# Patient Record
Sex: MALE | Race: WHITE | NOT HISPANIC OR LATINO | URBAN - METROPOLITAN AREA
[De-identification: names, ages, dates, MRNs, and addresses within clinical notes are randomized per-mention and may not be internally consistent; named-entity substitution may affect disease eponyms.]

---

## 2017-07-14 ENCOUNTER — APPOINTMENT (OUTPATIENT)
Dept: URBAN - METROPOLITAN AREA CLINIC 219 | Age: 72
Setting detail: DERMATOLOGY
End: 2017-07-14

## 2017-07-14 DIAGNOSIS — R20.2 PARESTHESIA OF SKIN: ICD-10-CM

## 2017-07-14 DIAGNOSIS — B35.3 TINEA PEDIS: ICD-10-CM

## 2017-07-14 DIAGNOSIS — L82.1 OTHER SEBORRHEIC KERATOSIS: ICD-10-CM

## 2017-07-14 PROBLEM — I10 ESSENTIAL (PRIMARY) HYPERTENSION: Status: ACTIVE | Noted: 2017-07-14

## 2017-07-14 PROCEDURE — OTHER REASSURANCE: OTHER

## 2017-07-14 PROCEDURE — OTHER PRESCRIPTION: OTHER

## 2017-07-14 PROCEDURE — 99214 OFFICE O/P EST MOD 30 MIN: CPT

## 2017-07-14 PROCEDURE — OTHER TREATMENT REGIMEN: OTHER

## 2017-07-14 RX ORDER — CICLOPIROX 7.7 MG/G
APPLY GEL TOPICAL
Qty: 1 | Refills: 3 | Status: ERX | COMMUNITY
Start: 2017-07-14

## 2017-07-14 ASSESSMENT — LOCATION SIMPLE DESCRIPTION DERM
LOCATION SIMPLE: LEFT PRETIBIAL REGION
LOCATION SIMPLE: RIGHT PRETIBIAL REGION
LOCATION SIMPLE: RIGHT UPPER BACK

## 2017-07-14 ASSESSMENT — LOCATION DETAILED DESCRIPTION DERM
LOCATION DETAILED: RIGHT MEDIAL UPPER BACK
LOCATION DETAILED: LEFT PROXIMAL PRETIBIAL REGION
LOCATION DETAILED: RIGHT PROXIMAL PRETIBIAL REGION

## 2017-07-14 ASSESSMENT — LOCATION ZONE DERM
LOCATION ZONE: LEG
LOCATION ZONE: TRUNK

## 2017-07-14 NOTE — PROCEDURE: TREATMENT REGIMEN
Continue Regimen: Ciclopirox 0.77% gel two times daily as needed between affected toes, left great toenail and feet
Otc Regimen: Sarna Lotion as needed for itching
Detail Level: Simple
Plan: 1 part vinegar/3 parts water soaks twice a day

## 2017-11-16 ENCOUNTER — APPOINTMENT (OUTPATIENT)
Dept: URBAN - METROPOLITAN AREA CLINIC 219 | Age: 72
Setting detail: DERMATOLOGY
End: 2017-11-16

## 2017-11-16 DIAGNOSIS — M10.0 IDIOPATHIC GOUT: ICD-10-CM

## 2017-11-16 PROBLEM — N40.0 BENIGN PROSTATIC HYPERPLASIA WITHOUT LOWER URINARY TRACT SYMPTOMS: Status: ACTIVE | Noted: 2017-11-16

## 2017-11-16 PROBLEM — M10.072 IDIOPATHIC GOUT, LEFT ANKLE AND FOOT: Status: ACTIVE | Noted: 2017-11-16

## 2017-11-16 PROCEDURE — OTHER TREATMENT REGIMEN: OTHER

## 2017-11-16 PROCEDURE — OTHER KOH PREP: OTHER

## 2017-11-16 PROCEDURE — 99213 OFFICE O/P EST LOW 20 MIN: CPT

## 2017-11-16 PROCEDURE — OTHER PRESCRIPTION: OTHER

## 2017-11-16 PROCEDURE — OTHER MIPS QUALITY: OTHER

## 2017-11-16 RX ORDER — PREDNISONE 10 MG/1
TABLET ORAL AS DIRECTED
Qty: 21 | Refills: 0 | Status: ERX

## 2017-11-16 RX ORDER — CICLOPIROX 7.7 MG/ML
APPLY SUSPENSION TOPICAL AS NEEDED
Qty: 1 | Refills: 5 | Status: ERX | COMMUNITY
Start: 2017-11-16

## 2017-11-16 ASSESSMENT — LOCATION ZONE DERM: LOCATION ZONE: TOE

## 2017-11-16 ASSESSMENT — LOCATION SIMPLE DESCRIPTION DERM: LOCATION SIMPLE: LEFT GREAT TOE

## 2017-11-16 ASSESSMENT — LOCATION DETAILED DESCRIPTION DERM: LOCATION DETAILED: LEFT DORSAL GREAT TOE

## 2017-11-16 NOTE — PROCEDURE: TREATMENT REGIMEN
Initiate Treatment: Ciclopirox ts twice a day as needed \\nPrednisone taper 40-20-10 x9 days
Plan: If persists patient to see Dr. Gonzales
Detail Level: Simple

## 2017-11-16 NOTE — HPI: SKIN IRRITATION
Additional History: Patient states he saw orthopedic doctor and they did an X-ray with nothing found,  was told probably fungal, something superficial.  Patient was told it was not gout.  Patient states it started 3 days ago and was much more swollen.

## 2018-12-30 NOTE — PROCEDURE: MIPS QUALITY
Detail Level: Detailed
Quality 110: Preventive Care And Screening: Influenza Immunization: Influenza Immunization Administered during Influenza season
WDL

## 2019-09-25 ENCOUNTER — APPOINTMENT (OUTPATIENT)
Dept: URBAN - METROPOLITAN AREA CLINIC 219 | Age: 74
Setting detail: DERMATOLOGY
End: 2019-09-25

## 2019-09-25 DIAGNOSIS — L85.3 XEROSIS CUTIS: ICD-10-CM

## 2019-09-25 DIAGNOSIS — L82.0 INFLAMED SEBORRHEIC KERATOSIS: ICD-10-CM

## 2019-09-25 DIAGNOSIS — L82.1 OTHER SEBORRHEIC KERATOSIS: ICD-10-CM

## 2019-09-25 DIAGNOSIS — L57.0 ACTINIC KERATOSIS: ICD-10-CM

## 2019-09-25 PROCEDURE — 99213 OFFICE O/P EST LOW 20 MIN: CPT | Mod: 25

## 2019-09-25 PROCEDURE — 17000 DESTRUCT PREMALG LESION: CPT | Mod: 59

## 2019-09-25 PROCEDURE — 17110 DESTRUCT B9 LESION 1-14: CPT

## 2019-09-25 PROCEDURE — 17003 DESTRUCT PREMALG LES 2-14: CPT | Mod: 59

## 2019-09-25 PROCEDURE — OTHER MIPS QUALITY: OTHER

## 2019-09-25 PROCEDURE — OTHER LIQUID NITROGEN: OTHER

## 2019-09-25 PROCEDURE — OTHER TREATMENT REGIMEN: OTHER

## 2019-09-25 PROCEDURE — OTHER REASSURANCE: OTHER

## 2019-09-25 ASSESSMENT — LOCATION SIMPLE DESCRIPTION DERM
LOCATION SIMPLE: RIGHT PRETIBIAL REGION
LOCATION SIMPLE: LEFT CHEEK
LOCATION SIMPLE: POSTERIOR SCALP
LOCATION SIMPLE: RIGHT FOREARM
LOCATION SIMPLE: RIGHT FOREHEAD
LOCATION SIMPLE: RIGHT CHEEK
LOCATION SIMPLE: LEFT PRETIBIAL REGION
LOCATION SIMPLE: RIGHT UPPER BACK
LOCATION SIMPLE: SCALP

## 2019-09-25 ASSESSMENT — LOCATION DETAILED DESCRIPTION DERM
LOCATION DETAILED: RIGHT MID-UPPER BACK
LOCATION DETAILED: RIGHT DISTAL PRETIBIAL REGION
LOCATION DETAILED: POSTERIOR MID-PARIETAL SCALP
LOCATION DETAILED: LEFT INFERIOR LATERAL MALAR CHEEK
LOCATION DETAILED: RIGHT FOREHEAD
LOCATION DETAILED: LEFT DISTAL PRETIBIAL REGION
LOCATION DETAILED: RIGHT CENTRAL MALAR CHEEK
LOCATION DETAILED: RIGHT SUPERIOR FOREHEAD
LOCATION DETAILED: RIGHT DISTAL RADIAL DORSAL FOREARM
LOCATION DETAILED: LEFT INFERIOR PREAURICULAR CHEEK
LOCATION DETAILED: RIGHT CENTRAL MANDIBULAR CHEEK
LOCATION DETAILED: LEFT SUPERIOR PARIETAL SCALP
LOCATION DETAILED: RIGHT SUPERIOR CENTRAL MALAR CHEEK
LOCATION DETAILED: RIGHT PROXIMAL PRETIBIAL REGION
LOCATION DETAILED: LEFT PROXIMAL PRETIBIAL REGION
LOCATION DETAILED: RIGHT PROXIMAL RADIAL DORSAL FOREARM

## 2019-09-25 ASSESSMENT — LOCATION ZONE DERM
LOCATION ZONE: FACE
LOCATION ZONE: SCALP
LOCATION ZONE: LEG
LOCATION ZONE: TRUNK
LOCATION ZONE: ARM

## 2019-09-25 NOTE — PROCEDURE: LIQUID NITROGEN
Post-Care Instructions: I reviewed with the patient in detail post-care instructions. Patient is to wear sunprotection, and avoid picking at any of the treated lesions. Pt may apply Vaseline to crusted or scabbing areas.
Number Of Freeze-Thaw Cycles: 1 freeze-thaw cycle
Medical Necessity Information: It is in your best interest to select a reason for this procedure from the list below. All of these items fulfill various CMS LCD requirements except the new and changing color options.
Detail Level: Detailed
Consent: The patient's consent was obtained including but not limited to risks of crusting, scabbing, blistering, scarring, darker or lighter pigmentary change, recurrence, incomplete removal and infection.
Render Note In Bullet Format When Appropriate: No
Duration Of Freeze Thaw-Cycle (Seconds): 0
Medical Necessity Clause: This procedure was medically necessary because the lesions that were treated were:

## 2021-01-19 ENCOUNTER — APPOINTMENT (OUTPATIENT)
Dept: URBAN - METROPOLITAN AREA CLINIC 219 | Age: 76
Setting detail: DERMATOLOGY
End: 2021-01-19

## 2021-01-19 DIAGNOSIS — L57.0 ACTINIC KERATOSIS: ICD-10-CM

## 2021-01-19 DIAGNOSIS — L85.3 XEROSIS CUTIS: ICD-10-CM

## 2021-01-19 PROCEDURE — OTHER MIPS QUALITY: OTHER

## 2021-01-19 PROCEDURE — 17003 DESTRUCT PREMALG LES 2-14: CPT

## 2021-01-19 PROCEDURE — 99212 OFFICE O/P EST SF 10 MIN: CPT | Mod: 25

## 2021-01-19 PROCEDURE — 17000 DESTRUCT PREMALG LESION: CPT

## 2021-01-19 PROCEDURE — OTHER LIQUID NITROGEN: OTHER

## 2021-01-19 PROCEDURE — OTHER TREATMENT REGIMEN: OTHER

## 2021-01-19 ASSESSMENT — LOCATION SIMPLE DESCRIPTION DERM
LOCATION SIMPLE: RIGHT UPPER ARM
LOCATION SIMPLE: RIGHT CHEEK
LOCATION SIMPLE: LEFT PRETIBIAL REGION
LOCATION SIMPLE: RIGHT PRETIBIAL REGION
LOCATION SIMPLE: RIGHT UPPER BACK

## 2021-01-19 ASSESSMENT — LOCATION DETAILED DESCRIPTION DERM
LOCATION DETAILED: RIGHT LATERAL UPPER BACK
LOCATION DETAILED: RIGHT ANTERIOR PROXIMAL UPPER ARM
LOCATION DETAILED: RIGHT PROXIMAL PRETIBIAL REGION
LOCATION DETAILED: RIGHT SUPERIOR CENTRAL MALAR CHEEK
LOCATION DETAILED: RIGHT DISTAL PRETIBIAL REGION
LOCATION DETAILED: LEFT DISTAL PRETIBIAL REGION

## 2021-01-19 ASSESSMENT — LOCATION ZONE DERM
LOCATION ZONE: FACE
LOCATION ZONE: TRUNK
LOCATION ZONE: LEG
LOCATION ZONE: ARM

## 2021-01-19 NOTE — PROCEDURE: TREATMENT REGIMEN
Plan: Increase emollients (Cerave Cream)\\nMild cleansers- Dove unscented bar soap
Detail Level: Simple

## 2021-12-28 ENCOUNTER — OFFICE VISIT (OUTPATIENT)
Dept: URBAN - NONMETROPOLITAN AREA SURGERY CENTER 1 | Facility: SURGERY CENTER | Age: 76
End: 2021-12-28
Payer: MEDICARE

## 2021-12-28 DIAGNOSIS — Z12.11 AVERAGE RISK FOR CRC. DUE TO PT'S CO-MORBID STATE WITH END STAGE DEMENTIA, HIGH RISK FOR ANESTHESIA (PER NEUROLOGY); INABILITY TO TAKE A BOWEL PREP....WOULD NOT ADVISE ANY COLORECTAL CANCER SCREENING INCLUDING STOOL TEST FOR FECAL BLOOD.: ICD-10-CM

## 2021-12-28 PROCEDURE — G0121 COLON CA SCRN NOT HI RSK IND: HCPCS | Performed by: INTERNAL MEDICINE

## 2021-12-28 PROCEDURE — G8907 PT DOC NO EVENTS ON DISCHARG: HCPCS | Performed by: INTERNAL MEDICINE

## 2022-05-11 ENCOUNTER — APPOINTMENT (OUTPATIENT)
Dept: URBAN - NONMETROPOLITAN AREA CLINIC 45 | Age: 77
Setting detail: DERMATOLOGY
End: 2022-05-11

## 2022-05-11 DIAGNOSIS — L57.0 ACTINIC KERATOSIS: ICD-10-CM

## 2022-05-11 DIAGNOSIS — L85.3 XEROSIS CUTIS: ICD-10-CM

## 2022-05-11 PROBLEM — H91.90 UNSPECIFIED HEARING LOSS, UNSPECIFIED EAR: Status: ACTIVE | Noted: 2022-05-11

## 2022-05-11 PROCEDURE — OTHER TREATMENT REGIMEN: OTHER

## 2022-05-11 PROCEDURE — 99213 OFFICE O/P EST LOW 20 MIN: CPT | Mod: 25

## 2022-05-11 PROCEDURE — OTHER LIQUID NITROGEN: OTHER

## 2022-05-11 PROCEDURE — 17000 DESTRUCT PREMALG LESION: CPT

## 2022-05-11 PROCEDURE — OTHER MIPS QUALITY: OTHER

## 2022-05-11 PROCEDURE — 17003 DESTRUCT PREMALG LES 2-14: CPT

## 2022-05-11 ASSESSMENT — LOCATION SIMPLE DESCRIPTION DERM
LOCATION SIMPLE: RIGHT PRETIBIAL REGION
LOCATION SIMPLE: LEFT PRETIBIAL REGION
LOCATION SIMPLE: RIGHT CHEEK
LOCATION SIMPLE: RIGHT FOREHEAD
LOCATION SIMPLE: LEFT CHEEK

## 2022-05-11 ASSESSMENT — LOCATION DETAILED DESCRIPTION DERM
LOCATION DETAILED: RIGHT DISTAL PRETIBIAL REGION
LOCATION DETAILED: LEFT DISTAL PRETIBIAL REGION
LOCATION DETAILED: RIGHT CENTRAL MALAR CHEEK
LOCATION DETAILED: RIGHT SUPERIOR FOREHEAD
LOCATION DETAILED: LEFT MEDIAL MALAR CHEEK
LOCATION DETAILED: RIGHT LATERAL BUCCAL CHEEK

## 2022-05-11 ASSESSMENT — LOCATION ZONE DERM
LOCATION ZONE: FACE
LOCATION ZONE: LEG

## 2022-05-11 NOTE — PROCEDURE: TREATMENT REGIMEN
Samples Given: Aveeno Cream, Cerave Cream
Detail Level: Simple
Continue Regimen: Increase emollients (Cerave Cream)\\nMild cleansers- Dove unscented bar soap

## 2022-05-11 NOTE — PROCEDURE: LIQUID NITROGEN
Consent: The patient's consent was obtained including but not limited to risks of crusting, scabbing, blistering, scarring, darker or lighter pigmentary change, recurrence, incomplete removal and infection.
Duration Of Freeze Thaw-Cycle (Seconds): 0
Render Post-Care Instructions In Note?: no
Detail Level: Detailed
Post-Care Instructions: I reviewed with the patient in detail post-care instructions. Patient is to wear sunprotection, and avoid picking at any of the treated lesions. Pt may apply Vaseline to crusted or scabbing areas.
Show Aperture Variable?: Yes

## 2023-02-23 ENCOUNTER — WEB ENCOUNTER (OUTPATIENT)
Dept: URBAN - METROPOLITAN AREA TELEHEALTH 2 | Facility: TELEHEALTH | Age: 78
End: 2023-02-23

## 2023-03-01 ENCOUNTER — TELEPHONE ENCOUNTER (OUTPATIENT)
Dept: URBAN - METROPOLITAN AREA CLINIC 92 | Facility: CLINIC | Age: 78
End: 2023-03-01

## 2023-03-01 ENCOUNTER — OFFICE VISIT (OUTPATIENT)
Dept: URBAN - METROPOLITAN AREA TELEHEALTH 2 | Facility: TELEHEALTH | Age: 78
End: 2023-03-01
Payer: MEDICARE

## 2023-03-01 DIAGNOSIS — R15.1 FECAL SMEARING: ICD-10-CM

## 2023-03-01 DIAGNOSIS — K21.9 GASTROESOPHAGEAL REFLUX DISEASE, UNSPECIFIED WHETHER ESOPHAGITIS PRESENT: ICD-10-CM

## 2023-03-01 DIAGNOSIS — R13.19 ESOPHAGEAL DYSPHAGIA: ICD-10-CM

## 2023-03-01 DIAGNOSIS — K57.30 ACQUIRED DIVERTICULOSIS OF COLON: ICD-10-CM

## 2023-03-01 PROBLEM — 305058001: Status: ACTIVE | Noted: 2023-03-01

## 2023-03-01 PROCEDURE — 99214 OFFICE O/P EST MOD 30 MIN: CPT | Performed by: NURSE PRACTITIONER

## 2023-03-01 RX ORDER — ASPIRIN 81 MG/1
1 TABLET TABLET, CHEWABLE ORAL ONCE A DAY
Status: ACTIVE | COMMUNITY

## 2023-03-01 NOTE — HPI-TODAY'S VISIT:
3/1/2023 Mr. Nathan presents for evaluation of GERD. He was referred by Dr. Lindsey. Last fall he developed a cough. He started omeprazole 20mg daily with no change. He increased his symptoms to omeprazole 40mg daily. He had an esophagram with reflux but no stricture. He did have occsasional orophayrgeal dysphagia. Howeever this improved. His cough seemed to improve. He did have some loose stools and he was placed on Luis Carlos and weaned his omeprazole to 20mg daily. He is now off PPI therapy. He has no symptoms of cough now. He denies any significant further GI complaints.   In August during a trip he developed increased gas with mild smearing with passing gas. He had a colonoscopy in 2021 with pan diverticular diseae. This has improved today. MB  This telehealth visit was provided at the patients home.

## 2023-03-27 ENCOUNTER — WEB ENCOUNTER (OUTPATIENT)
Dept: URBAN - NONMETROPOLITAN AREA CLINIC 2 | Facility: CLINIC | Age: 78
End: 2023-03-27

## 2023-03-28 ENCOUNTER — WEB ENCOUNTER (OUTPATIENT)
Dept: URBAN - NONMETROPOLITAN AREA CLINIC 2 | Facility: CLINIC | Age: 78
End: 2023-03-28

## 2023-04-24 ENCOUNTER — WEB ENCOUNTER (OUTPATIENT)
Dept: URBAN - NONMETROPOLITAN AREA CLINIC 13 | Facility: CLINIC | Age: 78
End: 2023-04-24

## 2023-04-26 ENCOUNTER — OFFICE VISIT (OUTPATIENT)
Dept: URBAN - NONMETROPOLITAN AREA CLINIC 13 | Facility: CLINIC | Age: 78
End: 2023-04-26

## 2023-05-09 ENCOUNTER — TELEPHONE ENCOUNTER (OUTPATIENT)
Dept: URBAN - NONMETROPOLITAN AREA CLINIC 2 | Facility: CLINIC | Age: 78
End: 2023-05-09

## 2023-05-10 ENCOUNTER — LAB OUTSIDE AN ENCOUNTER (OUTPATIENT)
Dept: URBAN - NONMETROPOLITAN AREA CLINIC 13 | Facility: CLINIC | Age: 78
End: 2023-05-10

## 2023-05-10 ENCOUNTER — WEB ENCOUNTER (OUTPATIENT)
Dept: URBAN - NONMETROPOLITAN AREA CLINIC 13 | Facility: CLINIC | Age: 78
End: 2023-05-10

## 2023-05-10 ENCOUNTER — OFFICE VISIT (OUTPATIENT)
Dept: URBAN - NONMETROPOLITAN AREA CLINIC 13 | Facility: CLINIC | Age: 78
End: 2023-05-10
Payer: MEDICARE

## 2023-05-10 VITALS
TEMPERATURE: 98.5 F | SYSTOLIC BLOOD PRESSURE: 131 MMHG | WEIGHT: 158.2 LBS | HEART RATE: 69 BPM | DIASTOLIC BLOOD PRESSURE: 72 MMHG | HEIGHT: 62 IN | BODY MASS INDEX: 29.11 KG/M2

## 2023-05-10 DIAGNOSIS — K21.9 GASTROESOPHAGEAL REFLUX DISEASE, UNSPECIFIED WHETHER ESOPHAGITIS PRESENT: ICD-10-CM

## 2023-05-10 DIAGNOSIS — R13.19 ESOPHAGEAL DYSPHAGIA: ICD-10-CM

## 2023-05-10 PROCEDURE — 99214 OFFICE O/P EST MOD 30 MIN: CPT | Performed by: NURSE PRACTITIONER

## 2023-05-10 RX ORDER — FAMOTIDINE 20 MG/1
1 TABLET TABLET ORAL ONCE A DAY
Qty: 90 TABLET | Refills: 3 | OUTPATIENT
Start: 2023-05-10

## 2023-05-10 RX ORDER — ASPIRIN 81 MG/1
1 TABLET TABLET, CHEWABLE ORAL ONCE A DAY
Status: ACTIVE | COMMUNITY

## 2023-05-16 ENCOUNTER — WEB ENCOUNTER (OUTPATIENT)
Dept: URBAN - NONMETROPOLITAN AREA SURGERY CENTER 1 | Facility: SURGERY CENTER | Age: 78
End: 2023-05-16

## 2023-05-19 ENCOUNTER — OFFICE VISIT (OUTPATIENT)
Dept: URBAN - NONMETROPOLITAN AREA SURGERY CENTER 1 | Facility: SURGERY CENTER | Age: 78
End: 2023-05-19
Payer: MEDICARE

## 2023-05-19 ENCOUNTER — CLAIMS CREATED FROM THE CLAIM WINDOW (OUTPATIENT)
Dept: URBAN - METROPOLITAN AREA CLINIC 4 | Facility: CLINIC | Age: 78
End: 2023-05-19
Payer: MEDICARE

## 2023-05-19 DIAGNOSIS — K21.9 GASTRO-ESOPHAGEAL REFLUX DISEASE WITHOUT ESOPHAGITIS: ICD-10-CM

## 2023-05-19 DIAGNOSIS — K21.9 ACID REFLUX: ICD-10-CM

## 2023-05-19 DIAGNOSIS — K31.89 OTHER DISEASES OF STOMACH AND DUODENUM: ICD-10-CM

## 2023-05-19 DIAGNOSIS — K31.89 ACQUIRED DEFORMITY OF DUODENUM: ICD-10-CM

## 2023-05-19 PROCEDURE — 88342 IMHCHEM/IMCYTCHM 1ST ANTB: CPT | Performed by: PATHOLOGY

## 2023-05-19 PROCEDURE — G8907 PT DOC NO EVENTS ON DISCHARG: HCPCS | Performed by: INTERNAL MEDICINE

## 2023-05-19 PROCEDURE — 88305 TISSUE EXAM BY PATHOLOGIST: CPT | Performed by: PATHOLOGY

## 2023-05-19 PROCEDURE — 88312 SPECIAL STAINS GROUP 1: CPT | Performed by: PATHOLOGY

## 2023-05-19 PROCEDURE — 43239 EGD BIOPSY SINGLE/MULTIPLE: CPT | Performed by: INTERNAL MEDICINE

## 2023-05-31 ENCOUNTER — WEB ENCOUNTER (OUTPATIENT)
Dept: URBAN - NONMETROPOLITAN AREA CLINIC 2 | Facility: CLINIC | Age: 78
End: 2023-05-31

## 2023-06-13 ENCOUNTER — WEB ENCOUNTER (OUTPATIENT)
Dept: URBAN - NONMETROPOLITAN AREA CLINIC 2 | Facility: CLINIC | Age: 78
End: 2023-06-13

## 2023-08-01 NOTE — HPI-TODAY'S VISIT:
3/1/2023 Mr. Nathan presents for evaluation of GERD. He was referred by Dr. Lindsey. Last fall he developed a cough. He started omeprazole 20mg daily with no change. He increased his symptoms to omeprazole 40mg daily. He had an esophagram with reflux but no stricture. He did have occsasional orophayrgeal dysphagia. Howeever this improved. His cough seemed to improve. He did have some loose stools and he was placed on Luis Carlos and weaned his omeprazole to 20mg daily. He is now off PPI therapy. He has no symptoms of cough now. He denies any significant further GI complaints.   In August during a trip he developed increased gas with mild smearing with passing gas. He had a colonoscopy in 2021 with pan diverticular diseae. This has improved today. MB  This telehealth visit was provided at the patients home. 5/10/2023 Mr. Nathan presents for follow-up of reflux, dysphagia, and fecal smearing.  He is doing great on psyllium 2 at night.  He had no further fecal smearing.  His bloating has improved, he has been taking IBgard twice daily as needed.  He weaned off of the PPI then off of the Pepcid.  He has had an increase recurrence of reflux after lunch, we have discussed that the IBgard may be related.  He has not had an upper endoscopy in over the past 10 years.  Today we will pursue EGD to rule out Krishna's esophagus, I want him to restart Pepcid just in the morning.  Consider PPI therapy pending his EGD results.  Today he is doing well otherwise.  MB 
What Type Of Note Output Would You Prefer (Optional)?: Standard Output
Hpi Title: Evaluation of Skin Lesions

## 2023-08-07 ENCOUNTER — WEB ENCOUNTER (OUTPATIENT)
Dept: URBAN - NONMETROPOLITAN AREA CLINIC 13 | Facility: CLINIC | Age: 78
End: 2023-08-07

## 2023-08-23 ENCOUNTER — OFFICE VISIT (OUTPATIENT)
Dept: URBAN - NONMETROPOLITAN AREA CLINIC 13 | Facility: CLINIC | Age: 78
End: 2023-08-23
Payer: MEDICARE

## 2023-08-23 VITALS
SYSTOLIC BLOOD PRESSURE: 135 MMHG | TEMPERATURE: 98.6 F | HEIGHT: 62 IN | DIASTOLIC BLOOD PRESSURE: 85 MMHG | HEART RATE: 62 BPM | BODY MASS INDEX: 29.11 KG/M2 | WEIGHT: 158.2 LBS

## 2023-08-23 DIAGNOSIS — K21.9 ACID REFLUX: ICD-10-CM

## 2023-08-23 DIAGNOSIS — K57.90 DIVERTICULOSIS: ICD-10-CM

## 2023-08-23 DIAGNOSIS — R15.1 FECAL SMEARING: ICD-10-CM

## 2023-08-23 DIAGNOSIS — R13.19 ESOPHAGEAL DYSPHAGIA: ICD-10-CM

## 2023-08-23 PROCEDURE — 99214 OFFICE O/P EST MOD 30 MIN: CPT | Performed by: NURSE PRACTITIONER

## 2023-08-23 RX ORDER — FAMOTIDINE 20 MG/1
1 TABLET TABLET ORAL TWICE DAILY
Qty: 180 TABLET | Refills: 3
Start: 2023-05-10

## 2023-08-23 RX ORDER — FAMOTIDINE 20 MG/1
1 TABLET AT BEDTIME AS NEEDED TABLET ORAL ONCE A DAY
Qty: 90 TABLET | Refills: 3 | Status: ACTIVE | COMMUNITY
Start: 2023-05-10

## 2023-08-23 RX ORDER — ASPIRIN 81 MG/1
1 TABLET TABLET, CHEWABLE ORAL ONCE A DAY
Status: ACTIVE | COMMUNITY

## 2023-08-23 NOTE — HPI-TODAY'S VISIT:
3/1/2023 Mr. Nathan presents for evaluation of GERD. He was referred by Dr. Lindsey. Last fall he developed a cough. He started omeprazole 20mg daily with no change. He increased his symptoms to omeprazole 40mg daily. He had an esophagram with reflux but no stricture. He did have occsasional orophayrgeal dysphagia. Howeever this improved. His cough seemed to improve. He did have some loose stools and he was placed on Luis Carlos and weaned his omeprazole to 20mg daily. He is now off PPI therapy. He has no symptoms of cough now. He denies any significant further GI complaints.   In August during a trip he developed increased gas with mild smearing with passing gas. He had a colonoscopy in 2021 with pan diverticular diseae. This has improved today. MB  This telehealth visit was provided at the patients home. 5/10/2023 Mr. Nathan presents for follow-up of reflux, dysphagia, and fecal smearing.  He is doing great on psyllium 2 at night.  He had no further fecal smearing.  His bloating has improved, he has been taking IBgard twice daily as needed.  He weaned off of the PPI then off of the Pepcid.  He has had an increase recurrence of reflux after lunch, we have discussed that the IBgard may be related.  He has not had an upper endoscopy in over the past 10 years.  Today we will pursue EGD to rule out Krishna's esophagus, I want him to restart Pepcid just in the morning.  Consider PPI therapy pending his EGD results.  Today he is doing well otherwise.  MB  8/23/2023 Mr. Nathan presents for follow-up of dysphagia.  His EGD does reveal mild esophagitis.  He is on Pepcid 20 mg at night.  He has dyspepsia with belching and some reflux after breakfast and before lunch at times.  He would like to try Pepcid twice daily, he would like to avoid PPI use as he has a strong family history of dementia.  We have discussed again that low-dose PPI is not associated with vascular dementia.  He also continues to struggle with tenesmus and fecal smearing.  He is on 2 capsules of Metamucil twice daily.  His last colonoscopy was in 2021 by Dr. Corey with internal hemorrhoids.  We have discussed this is likely the culprit.  He agrees to steroid suppositories twice daily x14 days.  Consider flexible sigmoidoscopy if no relief.  MB

## 2023-10-11 ENCOUNTER — APPOINTMENT (OUTPATIENT)
Dept: URBAN - NONMETROPOLITAN AREA CLINIC 45 | Age: 78
Setting detail: DERMATOLOGY
End: 2023-10-20

## 2023-10-11 DIAGNOSIS — L57.8 OTHER SKIN CHANGES DUE TO CHRONIC EXPOSURE TO NONIONIZING RADIATION: ICD-10-CM

## 2023-10-11 DIAGNOSIS — L82.0 INFLAMED SEBORRHEIC KERATOSIS: ICD-10-CM

## 2023-10-11 DIAGNOSIS — L57.0 ACTINIC KERATOSIS: ICD-10-CM

## 2023-10-11 PROCEDURE — OTHER COUNSELING: OTHER

## 2023-10-11 PROCEDURE — 17003 DESTRUCT PREMALG LES 2-14: CPT | Mod: 59

## 2023-10-11 PROCEDURE — OTHER MIPS QUALITY: OTHER

## 2023-10-11 PROCEDURE — 17000 DESTRUCT PREMALG LESION: CPT | Mod: 59

## 2023-10-11 PROCEDURE — 99213 OFFICE O/P EST LOW 20 MIN: CPT | Mod: 25

## 2023-10-11 PROCEDURE — 17110 DESTRUCT B9 LESION 1-14: CPT

## 2023-10-11 PROCEDURE — OTHER SUNSCREEN RECOMMENDATIONS: OTHER

## 2023-10-11 PROCEDURE — OTHER LIQUID NITROGEN: OTHER

## 2023-10-11 ASSESSMENT — LOCATION DETAILED DESCRIPTION DERM
LOCATION DETAILED: RIGHT CLAVICULAR NECK
LOCATION DETAILED: INFERIOR THORACIC SPINE
LOCATION DETAILED: LEFT MEDIAL ZYGOMA
LOCATION DETAILED: LEFT DISTAL ULNAR DORSAL FOREARM
LOCATION DETAILED: RIGHT INFERIOR FOREHEAD
LOCATION DETAILED: RIGHT CENTRAL ZYGOMA
LOCATION DETAILED: RIGHT MEDIAL UPPER BACK
LOCATION DETAILED: LEFT SUPERIOR UPPER BACK
LOCATION DETAILED: LEFT CENTRAL TEMPLE

## 2023-10-11 ASSESSMENT — LOCATION SIMPLE DESCRIPTION DERM
LOCATION SIMPLE: LEFT UPPER BACK
LOCATION SIMPLE: RIGHT FOREHEAD
LOCATION SIMPLE: RIGHT UPPER BACK
LOCATION SIMPLE: UPPER BACK
LOCATION SIMPLE: RIGHT ANTERIOR NECK
LOCATION SIMPLE: LEFT FOREARM
LOCATION SIMPLE: LEFT TEMPLE
LOCATION SIMPLE: LEFT ZYGOMA
LOCATION SIMPLE: RIGHT ZYGOMA

## 2023-10-11 ASSESSMENT — LOCATION ZONE DERM
LOCATION ZONE: NECK
LOCATION ZONE: TRUNK
LOCATION ZONE: ARM
LOCATION ZONE: FACE

## 2023-10-11 NOTE — PROCEDURE: LIQUID NITROGEN
Detail Level: Detailed
Medical Necessity Information: It is in your best interest to select a reason for this procedure from the list below. All of these items fulfill various CMS LCD requirements except the new and changing color options.
Spray Paint Technique: No
Show Applicator Variable?: Yes
Medical Necessity Clause: This procedure was medically necessary because the lesions that were treated were:
Spray Paint Text: The liquid nitrogen was applied to the skin utilizing a spray paint frosting technique.
Post-Care Instructions: I reviewed with the patient in detail post-care instructions. Patient is to wear sunprotection, and avoid picking at any of the treated lesions. Pt may apply Vaseline to crusted or scabbing areas.
Consent: The patient's consent was obtained including but not limited to risks of crusting, scabbing, blistering, scarring, darker or lighter pigmentary change, recurrence, incomplete removal and infection.
Duration Of Freeze Thaw-Cycle (Seconds): 0

## 2023-10-11 NOTE — PROCEDURE: MIPS QUALITY
Quality 47: Advance Care Plan: Advance Care Planning discussed and documented in the medical record; patient did not wish or was not able to name a surrogate decision maker or provide an advance care plan.
Detail Level: Detailed
Quality 431: Preventive Care And Screening: Unhealthy Alcohol Use - Screening: Patient not identified as an unhealthy alcohol user when screened for unhealthy alcohol use using a systematic screening method
Quality 130: Documentation Of Current Medications In The Medical Record: Current Medications Documented
Quality 110: Preventive Care And Screening: Influenza Immunization: Influenza Immunization Administered during Influenza season
Quality 226: Preventive Care And Screening: Tobacco Use: Screening And Cessation Intervention: Patient screened for tobacco use and is an ex/non-smoker
Quality 111:Pneumonia Vaccination Status For Older Adults: Patient received any pneumococcal conjugate or polysaccharide vaccine on or after their 60th birthday and before the end of the measurement period

## 2023-11-30 ENCOUNTER — CLAIMS CREATED FROM THE CLAIM WINDOW (OUTPATIENT)
Dept: URBAN - NONMETROPOLITAN AREA CLINIC 2 | Facility: CLINIC | Age: 78
End: 2023-11-30
Payer: MEDICARE

## 2023-11-30 VITALS
HEART RATE: 60 BPM | TEMPERATURE: 98 F | DIASTOLIC BLOOD PRESSURE: 94 MMHG | WEIGHT: 162 LBS | HEIGHT: 62 IN | BODY MASS INDEX: 29.81 KG/M2 | SYSTOLIC BLOOD PRESSURE: 162 MMHG

## 2023-11-30 DIAGNOSIS — Z12.11 COLON CANCER SCREENING: ICD-10-CM

## 2023-11-30 DIAGNOSIS — K57.90 DIVERTICULOSIS: ICD-10-CM

## 2023-11-30 DIAGNOSIS — R15.1 FECAL SMEARING: ICD-10-CM

## 2023-11-30 DIAGNOSIS — K21.9 GASTROESOPHAGEAL REFLUX DISEASE, UNSPECIFIED WHETHER ESOPHAGITIS PRESENT: ICD-10-CM

## 2023-11-30 DIAGNOSIS — R13.19 ESOPHAGEAL DYSPHAGIA: ICD-10-CM

## 2023-11-30 DIAGNOSIS — K64.9 HEMORRHOIDS, UNSPECIFIED HEMORRHOID TYPE: ICD-10-CM

## 2023-11-30 DIAGNOSIS — K21.9 ACID REFLUX: ICD-10-CM

## 2023-11-30 PROBLEM — 40890009: Status: ACTIVE | Noted: 2023-03-01

## 2023-11-30 PROBLEM — 397881000: Status: ACTIVE | Noted: 2023-03-01

## 2023-11-30 PROBLEM — 225593006: Status: ACTIVE | Noted: 2023-03-01

## 2023-11-30 PROCEDURE — 99214 OFFICE O/P EST MOD 30 MIN: CPT | Performed by: NURSE PRACTITIONER

## 2023-11-30 RX ORDER — ASPIRIN 81 MG/1
1 TABLET TABLET, CHEWABLE ORAL ONCE A DAY
Status: ACTIVE | COMMUNITY

## 2023-11-30 RX ORDER — FAMOTIDINE 20 MG/1
1 TABLET TABLET ORAL TWICE DAILY
Qty: 180 TABLET | Refills: 3 | Status: ACTIVE | COMMUNITY
Start: 2023-05-10

## 2023-11-30 NOTE — HPI-TODAY'S VISIT:
3/1/2023 Mr. Nathan presents for evaluation of GERD. He was referred by Dr. Lindsey. Last fall he developed a cough. He started omeprazole 20mg daily with no change. He increased his symptoms to omeprazole 40mg daily. He had an esophagram with reflux but no stricture. He did have occsasional orophayrgeal dysphagia. Howeever this improved. His cough seemed to improve. He did have some loose stools and he was placed on Luis Carlos and weaned his omeprazole to 20mg daily. He is now off PPI therapy. He has no symptoms of cough now. He denies any significant further GI complaints.   In August during a trip he developed increased gas with mild smearing with passing gas. He had a colonoscopy in 2021 with pan diverticular diseae. This has improved today. MB  This telehealth visit was provided at the patients home. 5/10/2023 Mr. Nathan presents for follow-up of reflux, dysphagia, and fecal smearing.  He is doing great on psyllium 2 at night.  He had no further fecal smearing.  His bloating has improved, he has been taking IBgard twice daily as needed.  He weaned off of the PPI then off of the Pepcid.  He has had an increase recurrence of reflux after lunch, we have discussed that the IBgard may be related.  He has not had an upper endoscopy in over the past 10 years.  Today we will pursue EGD to rule out Krishna's esophagus, I want him to restart Pepcid just in the morning.  Consider PPI therapy pending his EGD results.  Today he is doing well otherwise.  MB  8/23/2023 Mr. Nathan presents for follow-up of dysphagia.  His EGD does reveal mild esophagitis.  He is on Pepcid 20 mg at night.  He has dyspepsia with belching and some reflux after breakfast and before lunch at times.  He would like to try Pepcid twice daily, he would like to avoid PPI use as he has a strong family history of dementia.  We have discussed again that low-dose PPI is not associated with vascular dementia.  He also continues to struggle with tenesmus and fecal smearing.  He is on 2 capsules of Metamucil twice daily.  His last colonoscopy was in 2021 by Dr. Corey with internal hemorrhoids.  We have discussed this is likely the culprit.  He agrees to steroid suppositories twice daily x14 days.  Consider flexible sigmoidoscopy if no relief.  MB 11/30/2023 Mr. Nathan presents for follow-up of reflux and fecal smearing.  He is doing great on Pepcid twice daily, Florastor in the morning, and 2 capsules of Metamucil twice daily.  He feels excellent on this regimen.  He does feel like treating the hemorrhoids resolved smearing.  Today he denies any new GI complaints.  He does report some straining at times, we have discussed he can cut back on the Florastor, for now he wants to continue his current regimen.  MB

## 2024-01-07 ENCOUNTER — WEB ENCOUNTER (OUTPATIENT)
Dept: URBAN - NONMETROPOLITAN AREA CLINIC 13 | Facility: CLINIC | Age: 79
End: 2024-01-07

## 2024-01-08 ENCOUNTER — WEB ENCOUNTER (OUTPATIENT)
Dept: URBAN - NONMETROPOLITAN AREA CLINIC 13 | Facility: CLINIC | Age: 79
End: 2024-01-08

## 2024-01-09 ENCOUNTER — WEB ENCOUNTER (OUTPATIENT)
Dept: URBAN - NONMETROPOLITAN AREA CLINIC 13 | Facility: CLINIC | Age: 79
End: 2024-01-09

## 2024-01-31 ENCOUNTER — WEB ENCOUNTER (OUTPATIENT)
Dept: URBAN - NONMETROPOLITAN AREA CLINIC 2 | Facility: CLINIC | Age: 79
End: 2024-01-31

## 2024-02-06 ENCOUNTER — OV EP (OUTPATIENT)
Dept: URBAN - NONMETROPOLITAN AREA CLINIC 2 | Facility: CLINIC | Age: 79
End: 2024-02-06
Payer: MEDICARE

## 2024-02-06 VITALS
BODY MASS INDEX: 29.81 KG/M2 | DIASTOLIC BLOOD PRESSURE: 100 MMHG | WEIGHT: 162 LBS | SYSTOLIC BLOOD PRESSURE: 162 MMHG | HEIGHT: 62 IN | TEMPERATURE: 98.7 F | HEART RATE: 65 BPM

## 2024-02-06 DIAGNOSIS — K57.30 ACQUIRED DIVERTICULOSIS OF COLON: ICD-10-CM

## 2024-02-06 DIAGNOSIS — K58.8 OTHER IRRITABLE BOWEL SYNDROME: ICD-10-CM

## 2024-02-06 DIAGNOSIS — R13.19 ESOPHAGEAL DYSPHAGIA: ICD-10-CM

## 2024-02-06 DIAGNOSIS — K21.9 ACID REFLUX: ICD-10-CM

## 2024-02-06 PROBLEM — 235595009: Status: ACTIVE | Noted: 2023-03-01

## 2024-02-06 PROBLEM — 70153002: Status: ACTIVE | Noted: 2023-08-23

## 2024-02-06 PROBLEM — 10743008: Status: ACTIVE | Noted: 2024-02-06

## 2024-02-06 PROCEDURE — 99214 OFFICE O/P EST MOD 30 MIN: CPT | Performed by: NURSE PRACTITIONER

## 2024-02-06 RX ORDER — ASPIRIN 81 MG/1
1 TABLET TABLET, CHEWABLE ORAL ONCE A DAY
Status: ACTIVE | COMMUNITY

## 2024-02-06 RX ORDER — RIFAXIMIN 550 MG/1
1 TABLET TABLET ORAL THREE TIMES A DAY
Qty: 42 TABLET | Refills: 0 | OUTPATIENT
Start: 2024-02-06 | End: 2024-02-20

## 2024-02-06 RX ORDER — FAMOTIDINE 20 MG/1
1 TABLET TABLET ORAL TWICE DAILY
Qty: 180 TABLET | Refills: 3 | Status: ACTIVE | COMMUNITY
Start: 2023-05-10

## 2024-02-06 NOTE — HPI-TODAY'S VISIT:
3/1/2023 Mr. Nathan presents for evaluation of GERD. He was referred by Dr. Lindsey. Last fall he developed a cough. He started omeprazole 20mg daily with no change. He increased his symptoms to omeprazole 40mg daily. He had an esophagram with reflux but no stricture. He did have occsasional orophayrgeal dysphagia. Howeever this improved. His cough seemed to improve. He did have some loose stools and he was placed on Luis Carlos and weaned his omeprazole to 20mg daily. He is now off PPI therapy. He has no symptoms of cough now. He denies any significant further GI complaints.   In August during a trip he developed increased gas with mild smearing with passing gas. He had a colonoscopy in 2021 with pan diverticular diseae. This has improved today. MB  This telehealth visit was provided at the patients home. 5/10/2023 Mr. Nathan presents for follow-up of reflux, dysphagia, and fecal smearing.  He is doing great on psyllium 2 at night.  He had no further fecal smearing.  His bloating has improved, he has been taking IBgard twice daily as needed.  He weaned off of the PPI then off of the Pepcid.  He has had an increase recurrence of reflux after lunch, we have discussed that the IBgard may be related.  He has not had an upper endoscopy in over the past 10 years.  Today we will pursue EGD to rule out Krishna's esophagus, I want him to restart Pepcid just in the morning.  Consider PPI therapy pending his EGD results.  Today he is doing well otherwise.  MB  8/23/2023 Mr. Nathan presents for follow-up of dysphagia.  His EGD does reveal mild esophagitis.  He is on Pepcid 20 mg at night.  He has dyspepsia with belching and some reflux after breakfast and before lunch at times.  He would like to try Pepcid twice daily, he would like to avoid PPI use as he has a strong family history of dementia.  We have discussed again that low-dose PPI is not associated with vascular dementia.  He also continues to struggle with tenesmus and fecal smearing.  He is on 2 capsules of Metamucil twice daily.  His last colonoscopy was in 2021 by Dr. Corey with internal hemorrhoids.  We have discussed this is likely the culprit.  He agrees to steroid suppositories twice daily x14 days.  Consider flexible sigmoidoscopy if no relief.  MB 11/30/2023 Mr. Nathan presents for follow-up of reflux and fecal smearing.  He is doing great on Pepcid twice daily, Florastor in the morning, and 2 capsules of Metamucil twice daily.  He feels excellent on this regimen.  He does feel like treating the hemorrhoids resolved smearing.  Today he denies any new GI complaints.  He does report some straining at times, we have discussed he can cut back on the Florastor, for now he wants to continue his current regimen.  MB 2/6/2024 Mr. Nathan presents for follow-up.  Since his last visit he is contracted COVID.  He developed bowel irregularity with worsening flare of his hemorrhoids including tenesmus and incomplete evacuation.  He is on 2 capsules of fiber twice daily and 2 capsules of Florastor twice daily.  I am concerned that the dosing of Florastor may be contributing to his complete evacuation.  He will complete his he agrees to decrease this to 1 twice daily and then 1 once daily.  Second course of 14-day steroid suppositories in the last 2 months.  If he continues to flare with anal rectal pressure with tenesmus, consider referral to Dr. Jose Hope for hemorrhoid banding.  He does describe abdominal discomfort, borborygmi, and bowel irregularity after his COVID infection last month.  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  MB

## 2024-03-26 ENCOUNTER — OV EP (OUTPATIENT)
Dept: URBAN - NONMETROPOLITAN AREA CLINIC 2 | Facility: CLINIC | Age: 79
End: 2024-03-26

## 2024-03-26 VITALS
WEIGHT: 160.8 LBS | HEART RATE: 69 BPM | TEMPERATURE: 98 F | SYSTOLIC BLOOD PRESSURE: 163 MMHG | HEIGHT: 62 IN | BODY MASS INDEX: 29.59 KG/M2 | DIASTOLIC BLOOD PRESSURE: 84 MMHG

## 2024-03-26 RX ORDER — DICYCLOMINE HYDROCHLORIDE 10 MG/1
1 CAPSULE CAPSULE ORAL
Qty: 90 CAPSULE | Refills: 6 | OUTPATIENT
Start: 2024-03-26 | End: 2024-10-22

## 2024-03-26 RX ORDER — ASPIRIN 81 MG/1
1 TABLET TABLET, CHEWABLE ORAL ONCE A DAY
Status: ACTIVE | COMMUNITY

## 2024-05-09 ENCOUNTER — WEB ENCOUNTER (OUTPATIENT)
Dept: URBAN - NONMETROPOLITAN AREA CLINIC 2 | Facility: CLINIC | Age: 79
End: 2024-05-09

## 2024-05-29 ENCOUNTER — CLAIMS CREATED FROM THE CLAIM WINDOW (OUTPATIENT)
Dept: URBAN - NONMETROPOLITAN AREA CLINIC 13 | Facility: CLINIC | Age: 79
End: 2024-05-29

## 2024-05-29 ENCOUNTER — DASHBOARD ENCOUNTERS (OUTPATIENT)
Age: 79
End: 2024-05-29

## 2024-05-29 ENCOUNTER — OFFICE VISIT (OUTPATIENT)
Dept: URBAN - NONMETROPOLITAN AREA CLINIC 13 | Facility: CLINIC | Age: 79
End: 2024-05-29
Payer: MEDICARE

## 2024-05-29 VITALS
SYSTOLIC BLOOD PRESSURE: 150 MMHG | DIASTOLIC BLOOD PRESSURE: 84 MMHG | HEART RATE: 64 BPM | WEIGHT: 156.4 LBS | HEIGHT: 62 IN | BODY MASS INDEX: 28.78 KG/M2

## 2024-05-29 DIAGNOSIS — R15.1 FECAL SMEARING: ICD-10-CM

## 2024-05-29 DIAGNOSIS — K58.8 OTHER IRRITABLE BOWEL SYNDROME: ICD-10-CM

## 2024-05-29 DIAGNOSIS — K21.9 GASTROESOPHAGEAL REFLUX DISEASE, UNSPECIFIED WHETHER ESOPHAGITIS PRESENT: ICD-10-CM

## 2024-05-29 PROCEDURE — 99214 OFFICE O/P EST MOD 30 MIN: CPT | Performed by: NURSE PRACTITIONER

## 2024-05-29 RX ORDER — ASPIRIN 81 MG/1
1 TABLET TABLET, CHEWABLE ORAL ONCE A DAY
Status: ACTIVE | COMMUNITY

## 2024-05-29 RX ORDER — DICYCLOMINE HYDROCHLORIDE 10 MG/1
1 CAPSULE CAPSULE ORAL
Qty: 90 CAPSULE | Refills: 6 | Status: ACTIVE | COMMUNITY
Start: 2024-03-26 | End: 2024-10-22

## 2024-07-26 ENCOUNTER — WEB ENCOUNTER (OUTPATIENT)
Dept: URBAN - NONMETROPOLITAN AREA CLINIC 13 | Facility: CLINIC | Age: 79
End: 2024-07-26

## 2024-08-06 ENCOUNTER — WEB ENCOUNTER (OUTPATIENT)
Dept: URBAN - NONMETROPOLITAN AREA CLINIC 13 | Facility: CLINIC | Age: 79
End: 2024-08-06

## 2024-08-07 ENCOUNTER — WEB ENCOUNTER (OUTPATIENT)
Dept: URBAN - NONMETROPOLITAN AREA CLINIC 13 | Facility: CLINIC | Age: 79
End: 2024-08-07

## 2024-08-08 ENCOUNTER — WEB ENCOUNTER (OUTPATIENT)
Dept: URBAN - NONMETROPOLITAN AREA CLINIC 13 | Facility: CLINIC | Age: 79
End: 2024-08-08

## 2024-08-09 ENCOUNTER — WEB ENCOUNTER (OUTPATIENT)
Dept: URBAN - NONMETROPOLITAN AREA CLINIC 13 | Facility: CLINIC | Age: 79
End: 2024-08-09

## 2024-08-29 ENCOUNTER — OFFICE VISIT (OUTPATIENT)
Dept: URBAN - NONMETROPOLITAN AREA CLINIC 2 | Facility: CLINIC | Age: 79
End: 2024-08-29
Payer: MEDICARE

## 2024-08-29 VITALS
BODY MASS INDEX: 28.89 KG/M2 | HEART RATE: 65 BPM | HEIGHT: 62 IN | TEMPERATURE: 98 F | DIASTOLIC BLOOD PRESSURE: 79 MMHG | SYSTOLIC BLOOD PRESSURE: 132 MMHG | WEIGHT: 157 LBS

## 2024-08-29 DIAGNOSIS — R13.19 ESOPHAGEAL DYSPHAGIA: ICD-10-CM

## 2024-08-29 DIAGNOSIS — K21.9 GASTROESOPHAGEAL REFLUX DISEASE, UNSPECIFIED WHETHER ESOPHAGITIS PRESENT: ICD-10-CM

## 2024-08-29 DIAGNOSIS — K57.50 DIVERTICUL DISEASE SMALL AND LARGE INTESTINE, NO PERFORATI OR ABSCESS: ICD-10-CM

## 2024-08-29 DIAGNOSIS — K58.8 OTHER IRRITABLE BOWEL SYNDROME: ICD-10-CM

## 2024-08-29 PROCEDURE — 99214 OFFICE O/P EST MOD 30 MIN: CPT | Performed by: NURSE PRACTITIONER

## 2024-08-29 RX ORDER — COLESTIPOL HYDROCHLORIDE 1 G/1
1 TABLET TABLET, FILM COATED ORAL ONCE A DAY
Qty: 90 TABLET | Refills: 3 | OUTPATIENT
Start: 2024-08-29

## 2024-08-29 RX ORDER — ASPIRIN 81 MG/1
1 TABLET TABLET, CHEWABLE ORAL ONCE A DAY
Status: ACTIVE | COMMUNITY

## 2024-08-29 NOTE — HPI-TODAY'S VISIT:
3/1/2023 Mr. Nathan presents for evaluation of GERD. He was referred by Dr. Lindsey. Last fall he developed a cough. He started omeprazole 20mg daily with no change. He increased his symptoms to omeprazole 40mg daily. He had an esophagram with reflux but no stricture. He did have occsasional orophayrgeal dysphagia. Howeever this improved. His cough seemed to improve. He did have some loose stools and he was placed on Luis Carlos and weaned his omeprazole to 20mg daily. He is now off PPI therapy. He has no symptoms of cough now. He denies any significant further GI complaints.   In August during a trip he developed increased gas with mild smearing with passing gas. He had a colonoscopy in 2021 with pan diverticular diseae. This has improved today. MB  This telehealth visit was provided at the patients home. 5/10/2023 Mr. Nathan presents for follow-up of reflux, dysphagia, and fecal smearing.  He is doing great on psyllium 2 at night.  He had no further fecal smearing.  His bloating has improved, he has been taking IBgard twice daily as needed.  He weaned off of the PPI then off of the Pepcid.  He has had an increase recurrence of reflux after lunch, we have discussed that the IBgard may be related.  He has not had an upper endoscopy in over the past 10 years.  Today we will pursue EGD to rule out Krishna's esophagus, I want him to restart Pepcid just in the morning.  Consider PPI therapy pending his EGD results.  Today he is doing well otherwise.  MB  8/23/2023 Mr. Nathan presents for follow-up of dysphagia.  His EGD does reveal mild esophagitis.  He is on Pepcid 20 mg at night.  He has dyspepsia with belching and some reflux after breakfast and before lunch at times.  He would like to try Pepcid twice daily, he would like to avoid PPI use as he has a strong family history of dementia.  We have discussed again that low-dose PPI is not associated with vascular dementia.  He also continues to struggle with tenesmus and fecal smearing.  He is on 2 capsules of Metamucil twice daily.  His last colonoscopy was in 2021 by Dr. Corey with internal hemorrhoids.  We have discussed this is likely the culprit.  He agrees to steroid suppositories twice daily x14 days.  Consider flexible sigmoidoscopy if no relief.  MB 11/30/2023 Mr. Nathan presents for follow-up of reflux and fecal smearing.  He is doing great on Pepcid twice daily, Florastor in the morning, and 2 capsules of Metamucil twice daily.  He feels excellent on this regimen.  He does feel like treating the hemorrhoids resolved smearing.  Today he denies any new GI complaints.  He does report some straining at times, we have discussed he can cut back on the Florastor, for now he wants to continue his current regimen.  MB 2/6/2024 Mr. Nathan presents for follow-up.  Since his last visit he is contracted COVID.  He developed bowel irregularity with worsening flare of his hemorrhoids including tenesmus and incomplete evacuation.  He is on 2 capsules of fiber twice daily and 2 capsules of Florastor twice daily.  I am concerned that the dosing of Florastor may be contributing to his complete evacuation.  He will complete his he agrees to decrease this to 1 twice daily and then 1 once daily.  Second course of 14-day steroid suppositories in the last 2 months.  If he continues to flare with anal rectal pressure with tenesmus, consider referral to Dr. Jose Hope for hemorrhoid banding.  He does describe abdominal discomfort, borborygmi, and bowel irregularity after his COVID infection last month.  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  MB 3/26/2024 The patient presents today for follow-up of his reflux, diverticulosis, dysphagia, irritable bowel syndrome with gas, and occasional fecal leakage.  The patient has been doing quite well since her last visit.  He continues to complain of occasional urgency.  He was doing much better after his hemorrhoid banding by Dr. Ambriz but he is now having leakage again.  Today, we have had a long discussion about his diet.  He does eat a large amount of fiber.  I do want him to continue his 2 Metamucil capsules twice daily, but I do want him to try to substitute some protein for some of the fiber in his diet.  I am also going to add in dicyclomine for urgency.  His hemorrhoids are much improved.  His reflux has been well-controlled with Nexium 20 mg daily.  He has not had any further dysphagia.  We will see him back in the office in 6 months for further evaluation. 5/29/2024 Mr. Nathan presents for follow-up.  His reflux symptoms improved significantly on Nexium, he is back down to famotidine twice daily with some breakthrough.  He continues to have intermittent fecal smearing.  He is taking the psyllium twice daily, and has added in the dicyclomine twice daily.  This does help with his anorectal fracture and urgency.  Given his episodes of smearing, I am concerned he may have recurrence of his hemorrhoids.  He denies any straining.  He is having a soft bowel movement most days of the week but at times will skip.  Overall today he is doing fairly well.  Will discontinue the famotidine and restart the Nexium.  He is worried about muscle mass loss, will refer him to a nutritionist.  In regard to his fecal smearing, we will try hydrocortisone suppository twice daily for 7 days, he may have a recurrent internal hemorrhoid.  If no relief he does plan to follow-up with Dr. Hope so that she can examine him to make sure that he does not have recurrence.  Would continue psyllium twice daily, Florastor daily, and dicyclomine twice daily to 3 times daily as needed.MB 8/29/2024 Mr. Nathan presents for follow-up.  Dicyclomine caused significant dizziness and vertigo.  After discontinuing this he is continued Florastor daily 2 capsules of psyllium twice daily and 2 IBgard 3 times daily AC.  He had another episode of fecal incontinence today with passing gas.  He denies any rectal bleeding.  Today he agrees to discontinue the IBgard regularly and use as needed for bloating.  He will try colestipol 1 g daily for his incontinence.  He will continue low-dose psyllium at night and Florastor daily.  His reflux is controlled on as omeprazole 20 mg daily.  MB

## 2024-09-24 ENCOUNTER — OFFICE VISIT (OUTPATIENT)
Dept: URBAN - NONMETROPOLITAN AREA CLINIC 2 | Facility: CLINIC | Age: 79
End: 2024-09-24

## 2024-11-19 ENCOUNTER — WEB ENCOUNTER (OUTPATIENT)
Dept: URBAN - NONMETROPOLITAN AREA CLINIC 13 | Facility: CLINIC | Age: 79
End: 2024-11-19

## 2024-11-19 RX ORDER — HYDROCORTISONE ACETATE 25 MG/1
1 SUPPOSITORY SUPPOSITORY RECTAL TWICE DAILY
Qty: 14 SUPPOSITORY | Refills: 2 | OUTPATIENT
Start: 2024-11-20 | End: 2025-01-01

## 2024-11-20 ENCOUNTER — WEB ENCOUNTER (OUTPATIENT)
Dept: URBAN - NONMETROPOLITAN AREA CLINIC 13 | Facility: CLINIC | Age: 79
End: 2024-11-20

## 2024-11-20 RX ORDER — COLESTIPOL HYDROCHLORIDE 1 G/1
1 TABLET TABLET, FILM COATED ORAL ONCE A DAY
Qty: 90 TABLET | Refills: 3
Start: 2024-08-29

## 2024-12-09 ENCOUNTER — TELEPHONE ENCOUNTER (OUTPATIENT)
Dept: URBAN - NONMETROPOLITAN AREA CLINIC 2 | Facility: CLINIC | Age: 79
End: 2024-12-09

## 2024-12-12 ENCOUNTER — OFFICE VISIT (OUTPATIENT)
Dept: URBAN - NONMETROPOLITAN AREA CLINIC 2 | Facility: CLINIC | Age: 79
End: 2024-12-12
Payer: MEDICARE

## 2024-12-12 VITALS
DIASTOLIC BLOOD PRESSURE: 84 MMHG | HEART RATE: 55 BPM | SYSTOLIC BLOOD PRESSURE: 146 MMHG | WEIGHT: 158.4 LBS | BODY MASS INDEX: 29.15 KG/M2 | HEIGHT: 62 IN

## 2024-12-12 DIAGNOSIS — R13.19 ESOPHAGEAL DYSPHAGIA: ICD-10-CM

## 2024-12-12 DIAGNOSIS — R15.1 FECAL SMEARING: ICD-10-CM

## 2024-12-12 DIAGNOSIS — K57.30 ACQUIRED DIVERTICULOSIS OF COLON: ICD-10-CM

## 2024-12-12 DIAGNOSIS — K64.9 HEMORRHOIDS, UNSPECIFIED HEMORRHOID TYPE: ICD-10-CM

## 2024-12-12 DIAGNOSIS — Z12.11 COLON CANCER SCREENING: ICD-10-CM

## 2024-12-12 DIAGNOSIS — K58.1 IBS: ICD-10-CM

## 2024-12-12 DIAGNOSIS — K21.9 GASTROESOPHAGEAL REFLUX DISEASE, UNSPECIFIED WHETHER ESOPHAGITIS PRESENT: ICD-10-CM

## 2024-12-12 PROCEDURE — 99214 OFFICE O/P EST MOD 30 MIN: CPT | Performed by: NURSE PRACTITIONER

## 2024-12-12 RX ORDER — HYDROCORTISONE ACETATE 25 MG/1
1 SUPPOSITORY SUPPOSITORY RECTAL THREE TIMES A DAY
Qty: 42 SUPPOSITORIES | Refills: 2
Start: 2024-11-20 | End: 2025-01-23

## 2024-12-12 RX ORDER — HYDROCORTISONE ACETATE 25 MG/1
1 SUPPOSITORY SUPPOSITORY RECTAL TWICE DAILY
Qty: 14 SUPPOSITORY | Refills: 2 | Status: ACTIVE | COMMUNITY
Start: 2024-11-20 | End: 2025-01-01

## 2024-12-12 RX ORDER — ASPIRIN 81 MG/1
1 TABLET TABLET, CHEWABLE ORAL ONCE A DAY
Status: ACTIVE | COMMUNITY

## 2024-12-12 RX ORDER — COLESTIPOL HYDROCHLORIDE 1 G/1
1 TABLET TABLET, FILM COATED ORAL ONCE A DAY
Qty: 90 TABLET | Refills: 3 | Status: ACTIVE | COMMUNITY
Start: 2024-08-29

## 2024-12-12 RX ORDER — COLESTIPOL HYDROCHLORIDE 1 G/1
1 TABLET TABLET, FILM COATED ORAL ONCE A DAY
Qty: 90 TABLET | Refills: 3
Start: 2024-08-29

## 2024-12-12 NOTE — HPI-TODAY'S VISIT:
3/1/2023 Mr. Nathan presents for evaluation of GERD. He was referred by Dr. Lindsey. Last fall he developed a cough. He started omeprazole 20mg daily with no change. He increased his symptoms to omeprazole 40mg daily. He had an esophagram with reflux but no stricture. He did have occsasional orophayrgeal dysphagia. Howeever this improved. His cough seemed to improve. He did have some loose stools and he was placed on Luis Carlos and weaned his omeprazole to 20mg daily. He is now off PPI therapy. He has no symptoms of cough now. He denies any significant further GI complaints.   In August during a trip he developed increased gas with mild smearing with passing gas. He had a colonoscopy in 2021 with pan diverticular diseae. This has improved today. MB  This telehealth visit was provided at the patients home. 5/10/2023 Mr. Nathan presents for follow-up of reflux, dysphagia, and fecal smearing.  He is doing great on psyllium 2 at night.  He had no further fecal smearing.  His bloating has improved, he has been taking IBgard twice daily as needed.  He weaned off of the PPI then off of the Pepcid.  He has had an increase recurrence of reflux after lunch, we have discussed that the IBgard may be related.  He has not had an upper endoscopy in over the past 10 years.  Today we will pursue EGD to rule out Krishna's esophagus, I want him to restart Pepcid just in the morning.  Consider PPI therapy pending his EGD results.  Today he is doing well otherwise.  MB  8/23/2023 Mr. Nathan presents for follow-up of dysphagia.  His EGD does reveal mild esophagitis.  He is on Pepcid 20 mg at night.  He has dyspepsia with belching and some reflux after breakfast and before lunch at times.  He would like to try Pepcid twice daily, he would like to avoid PPI use as he has a strong family history of dementia.  We have discussed again that low-dose PPI is not associated with vascular dementia.  He also continues to struggle with tenesmus and fecal smearing.  He is on 2 capsules of Metamucil twice daily.  His last colonoscopy was in 2021 by Dr. Corey with internal hemorrhoids.  We have discussed this is likely the culprit.  He agrees to steroid suppositories twice daily x14 days.  Consider flexible sigmoidoscopy if no relief.  MB 11/30/2023 Mr. Nathan presents for follow-up of reflux and fecal smearing.  He is doing great on Pepcid twice daily, Florastor in the morning, and 2 capsules of Metamucil twice daily.  He feels excellent on this regimen.  He does feel like treating the hemorrhoids resolved smearing.  Today he denies any new GI complaints.  He does report some straining at times, we have discussed he can cut back on the Florastor, for now he wants to continue his current regimen.  MB 2/6/2024 Mr. Nathan presents for follow-up.  Since his last visit he is contracted COVID.  He developed bowel irregularity with worsening flare of his hemorrhoids including tenesmus and incomplete evacuation.  He is on 2 capsules of fiber twice daily and 2 capsules of Florastor twice daily.  I am concerned that the dosing of Florastor may be contributing to his complete evacuation.  He will complete his he agrees to decrease this to 1 twice daily and then 1 once daily.  Second course of 14-day steroid suppositories in the last 2 months.  If he continues to flare with anal rectal pressure with tenesmus, consider referral to Dr. Jose Hope for hemorrhoid banding.  He does describe abdominal discomfort, borborygmi, and bowel irregularity after his COVID infection last month.  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  MB 3/26/2024 The patient presents today for follow-up of his reflux, diverticulosis, dysphagia, irritable bowel syndrome with gas, and occasional fecal leakage.  The patient has been doing quite well since her last visit.  He continues to complain of occasional urgency.  He was doing much better after his hemorrhoid banding by Dr. Ambriz but he is now having leakage again.  Today, we have had a long discussion about his diet.  He does eat a large amount of fiber.  I do want him to continue his 2 Metamucil capsules twice daily, but I do want him to try to substitute some protein for some of the fiber in his diet.  I am also going to add in dicyclomine for urgency.  His hemorrhoids are much improved.  His reflux has been well-controlled with Nexium 20 mg daily.  He has not had any further dysphagia.  We will see him back in the office in 6 months for further evaluation. 5/29/2024 Mr. Nathan presents for follow-up.  His reflux symptoms improved significantly on Nexium, he is back down to famotidine twice daily with some breakthrough.  He continues to have intermittent fecal smearing.  He is taking the psyllium twice daily, and has added in the dicyclomine twice daily.  This does help with his anorectal fracture and urgency.  Given his episodes of smearing, I am concerned he may have recurrence of his hemorrhoids.  He denies any straining.  He is having a soft bowel movement most days of the week but at times will skip.  Overall today he is doing fairly well.  Will discontinue the famotidine and restart the Nexium.  He is worried about muscle mass loss, will refer him to a nutritionist.  In regard to his fecal smearing, we will try hydrocortisone suppository twice daily for 7 days, he may have a recurrent internal hemorrhoid.  If no relief he does plan to follow-up with Dr. Hope so that she can examine him to make sure that he does not have recurrence.  Would continue psyllium twice daily, Florastor daily, and dicyclomine twice daily to 3 times daily as needed.MB 8/29/2024 Mr. Nathan presents for follow-up.  Dicyclomine caused significant dizziness and vertigo.  After discontinuing this he is continued Florastor daily 2 capsules of psyllium twice daily and 2 IBgard 3 times daily AC.  He had another episode of fecal incontinence today with passing gas.  He denies any rectal bleeding.  Today he agrees to discontinue the IBgard regularly and use as needed for bloating.  He will try colestipol 1 g daily for his incontinence.  He will continue low-dose psyllium at night and Florastor daily.  His reflux is controlled on as omeprazole 20 mg daily.  MB 12/12/2024 Deon presents for follow-up.  He had a flare of incontinence with likely hemorrhoids in November we started steroid suppositories with resolution.  He is somewhat constipated on fiber colestipol and Florastor, we will him to discontinue the Florastor.  If he stops the colestipol he is concerned he will develop diarrhea again.  His main complaint is always incontinence and gas.  This has improved after the course of the steroid suppositories so I do suspect that an internal hemorrhoid plays a role in his incontinence, in regard to his gas he does eat a high-fiber diet he wants to hold off on Xifaxan for now.  MB

## 2024-12-17 ENCOUNTER — OFFICE VISIT (OUTPATIENT)
Dept: URBAN - NONMETROPOLITAN AREA CLINIC 2 | Facility: CLINIC | Age: 79
End: 2024-12-17

## 2025-02-12 NOTE — PHYSICAL EXAM NECK/THYROID:
Subjective:     Verbal consent was acquired by the patient to use [facility] ambient listening note generation during this visit     Brice Avila is a 83 y.o. male who presents for Cough (Chest tightness and when lying down it's worse to breathe/Grand dtr has pneumonia)       History of Present Illness  The patient is a 83-year-old male who presents for evaluation of progressive cough with history of COPD associated with family member with pneumonia    He has been experiencing an exacerbation of his COPD symptoms with increased cough, increased oxygen demands for the past 2 days, necessitating an increase in his oxygen therapy from 2 to 3 liters. He reports no fever but admits to a mild sore throat. He is uncertain about the presence of new body aches. He has not required antibiotics or steroids for previous COPD exacerbations. He is not producing excessive sputum but notes a change in its color to dark. He also reports increased shortness of breath. He was recently discharged from the hospital with a diagnosis of acute respiratory failure and was prescribed medication for a COPD exacerbation, which he completed. He is a former smoker. His current medications include an albuterol inhaler and Trelegy, which he uses as needed. He is currently on continuous oxygen therapy, which he occasionally discontinues when he feels less dyspneic.    Supplemental Information  He has a history of stroke.    SOCIAL HISTORY  He is a former smoker.    MEDICATIONS  Current: Albuterol inhaler, Trelegy      ROS      Medications:  albuterol Aers  amLODIPine Tabs  aspirin EC Tbec  busPIRone Tabs  hydroCHLOROthiazide Tabs  levothyroxine Tabs  linagliptin Tabs  simvastatin Tabs  tamsulosin  TRELEGY ELLIPTA INH    Allergies:             Patient has no known allergies.    Past Social Hx:  Brice Avila  reports that he quit smoking about 8 years ago. His smoking use included cigarettes. He started smoking about 48 years ago. He has a 82  normal appearance , no deformities , trachea midline pack-year smoking history. He has never used smokeless tobacco. He reports that he does not currently use drugs. He reports that he does not drink alcohol.           Problem list, medications, and allergies reviewed by myself today in Epic.     Objective:     /56 (BP Location: Left arm, Patient Position: Sitting, BP Cuff Size: Large adult)   Pulse 81   Temp 36.8 °C (98.2 °F) (Temporal)   Resp 19   Ht 1.829 m (6')   Wt 105 kg (231 lb)   SpO2 95%   BMI 31.33 kg/m²     Physical Exam  Pulmonary:      Effort: Pulmonary effort is normal. Prolonged expiration present. No accessory muscle usage or respiratory distress.      Breath sounds: Wheezing and rhonchi present.      Comments: Intermittent wheezing, rhonchi, scattered Rales bibasilar                RADIOLOGY RESULTS   DX-CHEST-2 VIEWS  Result Date: 2/11/2025 2/11/2025 6:06 PM HISTORY/REASON FOR EXAM:  Cough TECHNIQUE/EXAM DESCRIPTION: PA and lateral views of the chest. COMPARISON:  Chest x-ray 2/24/2023 FINDINGS: Minimal curvilinear opacities in the lung bases The cardiac silhouette is normal in size. No effusions or pneumothoraces are present. There are no significant osseous abnormalities. The visualized portions of the upper abdomen are within normal limits.     1.  Minimal bibasilar atelectasis.       Results for orders placed or performed in visit on 02/11/25   POCT CEPHEID COV-2, FLU A/B, RSV - PCR    Collection Time: 02/11/25  5:47 PM   Result Value Ref Range    SARS-CoV-2 by PCR Negative Negative, Invalid    Influenza virus A RNA Negative Negative, Invalid    Influenza virus B, PCR Negative Negative, Invalid    RSV, PCR Negative Negative, Invalid           Assessment/Plan:     Diagnosis and Associated Orders:     1. Acute cough  - POCT CEPHEID COV-2, FLU A/B, RSV - PCR  - DX-CHEST-2 VIEWS    2. Chronic obstructive pulmonary disease, unspecified COPD type (HCC)  - DX-CHEST-2 VIEWS    3. COPD exacerbation (HCC)  - doxycycline (VIBRAMYCIN) 100 MG  Tab; Take 1 Tablet by mouth 2 times a day for 7 days.  Dispense: 14 Tablet; Refill: 0  - predniSONE (DELTASONE) 20 MG Tab; Tab 2 po qd x 5 days  Dispense: 10 Tablet; Refill: 0        Medical Decision Making:    Pleasant 83 y.o. presents to clinic with:    Assessment & Plan  Chest x-ray with bibasilar atelectasis, viral panel negative.  Patient with history of COPD.  Will start empiric antibiotics  He reports worsening symptoms over the past two days, including increased shortness of breath and coughing without significant sputum production.  He has had a pneumonia exposure at home.  His chest x-ray demonstrates atelectasis.  He has had increased oxygen demands.  Fortunately vital signs stable and reassuring on today's exam.  He is currently using albuterol and Trelegy inhalers. His oxygen requirement has increased from 2 to 3 liters. He was recently hospitalized for acute respiratory failure and was treated for a COPD exacerbation. He is advised to seek immediate medical attention at the emergency room if his condition deteriorates, particularly if he experiences difficulty breathing or requires additional oxygen beyond his current supply.     I personally reviewed prior external notes and test results pertinent to today's visit. Patient is clinically stable at today's urgent care visit.  Patient appears nontoxic with no acute distress noted. Appropriate for outpatient care at this time.  Return to clinic or go to ED if symptoms worsen or persist.  Red flag symptoms discussed.  Patient/Parent/Guardian voices understanding.   All side effects of medication discussed including allergic response, GI upset, tendon injury, rash, sedation etc    Please note that this dictation was created using voice recognition software. I have made a reasonable attempt to correct obvious errors, but I expect that there are errors of grammar and possibly content that I did not discover before finalizing the note.    This note was  electronically signed by yN Gaston PA-C

## 2025-02-18 ENCOUNTER — OFFICE VISIT (OUTPATIENT)
Dept: URBAN - NONMETROPOLITAN AREA CLINIC 2 | Facility: CLINIC | Age: 80
End: 2025-02-18
Payer: MEDICARE

## 2025-02-18 VITALS
HEIGHT: 62 IN | BODY MASS INDEX: 30.07 KG/M2 | HEART RATE: 65 BPM | WEIGHT: 163.4 LBS | DIASTOLIC BLOOD PRESSURE: 74 MMHG | SYSTOLIC BLOOD PRESSURE: 118 MMHG | TEMPERATURE: 98 F

## 2025-02-18 DIAGNOSIS — K64.9 HEMORRHOIDS, UNSPECIFIED HEMORRHOID TYPE: ICD-10-CM

## 2025-02-18 DIAGNOSIS — K21.9 GASTROESOPHAGEAL REFLUX DISEASE, UNSPECIFIED WHETHER ESOPHAGITIS PRESENT: ICD-10-CM

## 2025-02-18 DIAGNOSIS — K58.9 IRRITABLE BOWEL SYNDROME, UNSPECIFIED TYPE: ICD-10-CM

## 2025-02-18 DIAGNOSIS — R13.19 ESOPHAGEAL DYSPHAGIA: ICD-10-CM

## 2025-02-18 DIAGNOSIS — Z12.11 COLON CANCER SCREENING: ICD-10-CM

## 2025-02-18 DIAGNOSIS — R15.1 FECAL SMEARING: ICD-10-CM

## 2025-02-18 DIAGNOSIS — K57.90 DIVERTICULOSIS: ICD-10-CM

## 2025-02-18 PROCEDURE — 99214 OFFICE O/P EST MOD 30 MIN: CPT | Performed by: INTERNAL MEDICINE

## 2025-02-18 RX ORDER — ASPIRIN 81 MG/1
1 TABLET TABLET, CHEWABLE ORAL ONCE A DAY
Status: ACTIVE | COMMUNITY

## 2025-02-18 RX ORDER — NAPROXEN 250 MG/1
1 TABLET WITH FOOD OR MILK AS NEEDED TABLET ORAL
Status: ACTIVE | COMMUNITY

## 2025-02-18 RX ORDER — COLESTIPOL HYDROCHLORIDE 1 G/1
1 TABLET TABLET, FILM COATED ORAL ONCE A DAY
Qty: 90 TABLET | Refills: 3 | Status: ACTIVE | COMMUNITY
Start: 2024-08-29

## 2025-02-18 NOTE — HPI-TODAY'S VISIT:
3/1/2023 Mr. Nathan presents for evaluation of GERD. He was referred by Dr. Lindsey. Last fall he developed a cough. He started omeprazole 20mg daily with no change. He increased his symptoms to omeprazole 40mg daily. He had an esophagram with reflux but no stricture. He did have occsasional orophayrgeal dysphagia. Howeever this improved. His cough seemed to improve. He did have some loose stools and he was placed on Luis Carlos and weaned his omeprazole to 20mg daily. He is now off PPI therapy. He has no symptoms of cough now. He denies any significant further GI complaints.   In August during a trip he developed increased gas with mild smearing with passing gas. He had a colonoscopy in 2021 with pan diverticular diseae. This has improved today. MB  This telehealth visit was provided at the patients home. 5/10/2023 Mr. Nathan presents for follow-up of reflux, dysphagia, and fecal smearing.  He is doing great on psyllium 2 at night.  He had no further fecal smearing.  His bloating has improved, he has been taking IBgard twice daily as needed.  He weaned off of the PPI then off of the Pepcid.  He has had an increase recurrence of reflux after lunch, we have discussed that the IBgard may be related.  He has not had an upper endoscopy in over the past 10 years.  Today we will pursue EGD to rule out Krishna's esophagus, I want him to restart Pepcid just in the morning.  Consider PPI therapy pending his EGD results.  Today he is doing well otherwise.  MB  8/23/2023 Mr. Nathan presents for follow-up of dysphagia.  His EGD does reveal mild esophagitis.  He is on Pepcid 20 mg at night.  He has dyspepsia with belching and some reflux after breakfast and before lunch at times.  He would like to try Pepcid twice daily, he would like to avoid PPI use as he has a strong family history of dementia.  We have discussed again that low-dose PPI is not associated with vascular dementia.  He also continues to struggle with tenesmus and fecal smearing.  He is on 2 capsules of Metamucil twice daily.  His last colonoscopy was in 2021 by Dr. Corey with internal hemorrhoids.  We have discussed this is likely the culprit.  He agrees to steroid suppositories twice daily x14 days.  Consider flexible sigmoidoscopy if no relief.  MB 11/30/2023 Mr. Nathan presents for follow-up of reflux and fecal smearing.  He is doing great on Pepcid twice daily, Florastor in the morning, and 2 capsules of Metamucil twice daily.  He feels excellent on this regimen.  He does feel like treating the hemorrhoids resolved smearing.  Today he denies any new GI complaints.  He does report some straining at times, we have discussed he can cut back on the Florastor, for now he wants to continue his current regimen.  MB 2/6/2024 Mr. Nathan presents for follow-up.  Since his last visit he is contracted COVID.  He developed bowel irregularity with worsening flare of his hemorrhoids including tenesmus and incomplete evacuation.  He is on 2 capsules of fiber twice daily and 2 capsules of Florastor twice daily.  I am concerned that the dosing of Florastor may be contributing to his complete evacuation.  He will complete his he agrees to decrease this to 1 twice daily and then 1 once daily.  Second course of 14-day steroid suppositories in the last 2 months.  If he continues to flare with anal rectal pressure with tenesmus, consider referral to Dr. Jose Hope for hemorrhoid banding.  He does describe abdominal discomfort, borborygmi, and bowel irregularity after his COVID infection last month.  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  MB 3/26/2024 The patient presents today for follow-up of his reflux, diverticulosis, dysphagia, irritable bowel syndrome with gas, and occasional fecal leakage.  The patient has been doing quite well since her last visit.  He continues to complain of occasional urgency.  He was doing much better after his hemorrhoid banding by Dr. Ambriz but he is now having leakage again.  Today, we have had a long discussion about his diet.  He does eat a large amount of fiber.  I do want him to continue his 2 Metamucil capsules twice daily, but I do want him to try to substitute some protein for some of the fiber in his diet.  I am also going to add in dicyclomine for urgency.  His hemorrhoids are much improved.  His reflux has been well-controlled with Nexium 20 mg daily.  He has not had any further dysphagia.  We will see him back in the office in 6 months for further evaluation. 5/29/2024 Mr. Nathan presents for follow-up.  His reflux symptoms improved significantly on Nexium, he is back down to famotidine twice daily with some breakthrough.  He continues to have intermittent fecal smearing.  He is taking the psyllium twice daily, and has added in the dicyclomine twice daily.  This does help with his anorectal fracture and urgency.  Given his episodes of smearing, I am concerned he may have recurrence of his hemorrhoids.  He denies any straining.  He is having a soft bowel movement most days of the week but at times will skip.  Overall today he is doing fairly well.  Will discontinue the famotidine and restart the Nexium.  He is worried about muscle mass loss, will refer him to a nutritionist.  In regard to his fecal smearing, we will try hydrocortisone suppository twice daily for 7 days, he may have a recurrent internal hemorrhoid.  If no relief he does plan to follow-up with Dr. Hope so that she can examine him to make sure that he does not have recurrence.  Would continue psyllium twice daily, Florastor daily, and dicyclomine twice daily to 3 times daily as needed.MB 8/29/2024 Mr. Nathan presents for follow-up.  Dicyclomine caused significant dizziness and vertigo.  After discontinuing this he is continued Florastor daily 2 capsules of psyllium twice daily and 2 IBgard 3 times daily AC.  He had another episode of fecal incontinence today with passing gas.  He denies any rectal bleeding.  Today he agrees to discontinue the IBgard regularly and use as needed for bloating.  He will try colestipol 1 g daily for his incontinence.  He will continue low-dose psyllium at night and Florastor daily.  His reflux is controlled on as omeprazole 20 mg daily.  MB 12/12/2024 Deon presents for follow-up.  He had a flare of incontinence with likely hemorrhoids in November we started steroid suppositories with resolution.  He is somewhat constipated on fiber colestipol and Florastor, we will him to discontinue the Florastor.  If he stops the colestipol he is concerned he will develop diarrhea again.  His main complaint is always incontinence and gas.  This has improved after the course of the steroid suppositories so I do suspect that an internal hemorrhoid plays a role in his incontinence, in regard to his gas he does eat a high-fiber diet he wants to hold off on Xifaxan for now.  MB 2/18/2025 The patient presents today for follow-up of his reflux and hemorrhoids.  Since her last visit, he was becoming more constipated on Florastor and colestipol with fiber.  He has stopped his Florastor, and his symptoms have now improved.  He is usually having 1, formed bowel movement daily.  We have discussed that if he does begin to have leakage again, I would add back the Florastor.  In the meantime, I would continue his to fiber capsules as at night and his colestipol in the morning.  His reflux has been well-controlled on Nexium 20 mg daily.  We have discussed the risks and benefits of staying on this medication, and he does wish to remain on the Nexium for now.  We will see him back in the office in 6 months for further evaluation.

## 2025-04-17 ENCOUNTER — TELEPHONE ENCOUNTER (OUTPATIENT)
Dept: URBAN - NONMETROPOLITAN AREA CLINIC 2 | Facility: CLINIC | Age: 80
End: 2025-04-17

## 2025-04-17 ENCOUNTER — LAB OUTSIDE AN ENCOUNTER (OUTPATIENT)
Dept: URBAN - NONMETROPOLITAN AREA CLINIC 2 | Facility: CLINIC | Age: 80
End: 2025-04-17

## 2025-04-17 ENCOUNTER — WEB ENCOUNTER (OUTPATIENT)
Age: 80
End: 2025-04-17

## 2025-04-17 ENCOUNTER — OFFICE VISIT (OUTPATIENT)
Dept: URBAN - NONMETROPOLITAN AREA CLINIC 2 | Facility: CLINIC | Age: 80
End: 2025-04-17
Payer: MEDICARE

## 2025-04-17 ENCOUNTER — TELEPHONE ENCOUNTER (OUTPATIENT)
Dept: URBAN - NONMETROPOLITAN AREA CLINIC 13 | Facility: CLINIC | Age: 80
End: 2025-04-17

## 2025-04-17 DIAGNOSIS — R15.1 FECAL SMEARING: ICD-10-CM

## 2025-04-17 DIAGNOSIS — R10.30 ABDOMINAL PAIN, LOWER: ICD-10-CM

## 2025-04-17 LAB
BASOPHILS AUTOMATED ABSOLUTE COUNT: 0
BASOPHILS RELATIVE PERCENT: 0.5
EOSINOPHILS AUTOMATED ABSOLUTE COUNT: 0.1
EOSINOPHILS RELATIVE PERCENT: 0.7
HEMATOCRIT: 41.9
HEMOGLOBIN: 13.8
LYMPHOCYTES AUTOMATED ABSOLUTE COUNT: 1.8
LYMPHOCYTES RELATIVE PERCENT: 20.9
MEAN CORPUSCULAR HEMOGLOBIN CONC: 32.9
MEAN CORPUSCULAR HEMOGLOBIN: 31
MEAN CORPUSCULAR VOLUME: 94.1
MEAN PLATELET VOLUME: 7.5
MONOCYTES AUTOMATED ABSOLUTE COUNT: 0.9
MONOCYTES RELATIVE PERCENT: 11
NEUTROPHILS AUTOMATED ABSOLUTE: 5.6
NEUTROPHILS RELATIVE PERCENT: 66.9
PAR PLATELET MORPHOLOGY: (no result)
PAR RBC MORPHOLOGY: (no result)
PLATELET COUNT: 259
RED BLOOD CELL COUNT: 4.45
RED CELL DISTRIBUTION WIDTH: 13
WHITE BLOOD CELL COUNT: 8.4

## 2025-04-17 PROCEDURE — 99213 OFFICE O/P EST LOW 20 MIN: CPT

## 2025-04-17 RX ORDER — ASPIRIN 81 MG/1
1 TABLET TABLET, CHEWABLE ORAL ONCE A DAY
Status: ACTIVE | COMMUNITY

## 2025-04-17 RX ORDER — COLESTIPOL HYDROCHLORIDE 1 G/1
1 TABLET TABLET, FILM COATED ORAL ONCE A DAY
Qty: 90 TABLET | Refills: 3 | Status: ACTIVE | COMMUNITY
Start: 2024-08-29

## 2025-04-17 RX ORDER — ONDANSETRON 4 MG/1
1 TABLET ON THE TONGUE AND ALLOW TO DISSOLVE TABLET, ORALLY DISINTEGRATING ORAL TWICE A DAY
Qty: 14 TABLET | Refills: 0 | OUTPATIENT
Start: 2025-04-17

## 2025-04-17 RX ORDER — NAPROXEN 250 MG/1
1 TABLET WITH FOOD OR MILK AS NEEDED TABLET ORAL
Status: ACTIVE | COMMUNITY

## 2025-04-17 NOTE — HPI-TODAY'S VISIT:
3/1/2023 Mr. Nathan presents for evaluation of GERD. He was referred by Dr. Lindsey. Last fall he developed a cough. He started omeprazole 20mg daily with no change. He increased his symptoms to omeprazole 40mg daily. He had an esophagram with reflux but no stricture. He did have occsasional orophayrgeal dysphagia. Howeever this improved. His cough seemed to improve. He did have some loose stools and he was placed on Luis Carlos and weaned his omeprazole to 20mg daily. He is now off PPI therapy. He has no symptoms of cough now. He denies any significant further GI complaints.   In August during a trip he developed increased gas with mild smearing with passing gas. He had a colonoscopy in 2021 with pan diverticular diseae. This has improved today. MB  This telehealth visit was provided at the patients home. 5/10/2023 Mr. Nathan presents for follow-up of reflux, dysphagia, and fecal smearing.  He is doing great on psyllium 2 at night.  He had no further fecal smearing.  His bloating has improved, he has been taking IBgard twice daily as needed.  He weaned off of the PPI then off of the Pepcid.  He has had an increase recurrence of reflux after lunch, we have discussed that the IBgard may be related.  He has not had an upper endoscopy in over the past 10 years.  Today we will pursue EGD to rule out Krishna's esophagus, I want him to restart Pepcid just in the morning.  Consider PPI therapy pending his EGD results.  Today he is doing well otherwise.  MB  8/23/2023 Mr. Nathan presents for follow-up of dysphagia.  His EGD does reveal mild esophagitis.  He is on Pepcid 20 mg at night.  He has dyspepsia with belching and some reflux after breakfast and before lunch at times.  He would like to try Pepcid twice daily, he would like to avoid PPI use as he has a strong family history of dementia.  We have discussed again that low-dose PPI is not associated with vascular dementia.  He also continues to struggle with tenesmus and fecal smearing.  He is on 2 capsules of Metamucil twice daily.  His last colonoscopy was in 2021 by Dr. Corey with internal hemorrhoids.  We have discussed this is likely the culprit.  He agrees to steroid suppositories twice daily x14 days.  Consider flexible sigmoidoscopy if no relief.  MB 11/30/2023 Mr. Nathan presents for follow-up of reflux and fecal smearing.  He is doing great on Pepcid twice daily, Florastor in the morning, and 2 capsules of Metamucil twice daily.  He feels excellent on this regimen.  He does feel like treating the hemorrhoids resolved smearing.  Today he denies any new GI complaints.  He does report some straining at times, we have discussed he can cut back on the Florastor, for now he wants to continue his current regimen.  MB 2/6/2024 Mr. Nathan presents for follow-up.  Since his last visit he is contracted COVID.  He developed bowel irregularity with worsening flare of his hemorrhoids including tenesmus and incomplete evacuation.  He is on 2 capsules of fiber twice daily and 2 capsules of Florastor twice daily.  I am concerned that the dosing of Florastor may be contributing to his complete evacuation.  He will complete his he agrees to decrease this to 1 twice daily and then 1 once daily.  Second course of 14-day steroid suppositories in the last 2 months.  If he continues to flare with anal rectal pressure with tenesmus, consider referral to Dr. Jose Hope for hemorrhoid banding.  He does describe abdominal discomfort, borborygmi, and bowel irregularity after his COVID infection last month.  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  MB 3/26/2024 The patient presents today for follow-up of his reflux, diverticulosis, dysphagia, irritable bowel syndrome with gas, and occasional fecal leakage.  The patient has been doing quite well since her last visit.  He continues to complain of occasional urgency.  He was doing much better after his hemorrhoid banding by Dr. Ambriz but he is now having leakage again.  Today, we have had a long discussion about his diet.  He does eat a large amount of fiber.  I do want him to continue his 2 Metamucil capsules twice daily, but I do want him to try to substitute some protein for some of the fiber in his diet.  I am also going to add in dicyclomine for urgency.  His hemorrhoids are much improved.  His reflux has been well-controlled with Nexium 20 mg daily.  He has not had any further dysphagia.  We will see him back in the office in 6 months for further evaluation. 5/29/2024 Mr. Nathan presents for follow-up.  His reflux symptoms improved significantly on Nexium, he is back down to famotidine twice daily with some breakthrough.  He continues to have intermittent fecal smearing.  He is taking the psyllium twice daily, and has added in the dicyclomine twice daily.  This does help with his anorectal fracture and urgency.  Given his episodes of smearing, I am concerned he may have recurrence of his hemorrhoids.  He denies any straining.  He is having a soft bowel movement most days of the week but at times will skip.  Overall today he is doing fairly well.  Will discontinue the famotidine and restart the Nexium.  He is worried about muscle mass loss, will refer him to a nutritionist.  In regard to his fecal smearing, we will try hydrocortisone suppository twice daily for 7 days, he may have a recurrent internal hemorrhoid.  If no relief he does plan to follow-up with Dr. Hope so that she can examine him to make sure that he does not have recurrence.  Would continue psyllium twice daily, Florastor daily, and dicyclomine twice daily to 3 times daily as needed.MB 8/29/2024 Mr. Nathan presents for follow-up.  Dicyclomine caused significant dizziness and vertigo.  After discontinuing this he is continued Florastor daily 2 capsules of psyllium twice daily and 2 IBgard 3 times daily AC.  He had another episode of fecal incontinence today with passing gas.  He denies any rectal bleeding.  Today he agrees to discontinue the IBgard regularly and use as needed for bloating.  He will try colestipol 1 g daily for his incontinence.  He will continue low-dose psyllium at night and Florastor daily.  His reflux is controlled on as omeprazole 20 mg daily.  MB 12/12/2024 Deon presents for follow-up.  He had a flare of incontinence with likely hemorrhoids in November we started steroid suppositories with resolution.  He is somewhat constipated on fiber colestipol and Florastor, we will him to discontinue the Florastor.  If he stops the colestipol he is concerned he will develop diarrhea again.  His main complaint is always incontinence and gas.  This has improved after the course of the steroid suppositories so I do suspect that an internal hemorrhoid plays a role in his incontinence, in regard to his gas he does eat a high-fiber diet he wants to hold off on Xifaxan for now.  MB 2/18/2025 The patient presents today for follow-up of his reflux and hemorrhoids.  Since her last visit, he was becoming more constipated on Florastor and colestipol with fiber.  He has stopped his Florastor, and his symptoms have now improved.  He is usually having 1, formed bowel movement daily.  We have discussed that if he does begin to have leakage again, I would add back the Florastor.  In the meantime, I would continue his to fiber capsules as at night and his colestipol in the morning.  His reflux has been well-controlled on Nexium 20 mg daily.  We have discussed the risks and benefits of staying on this medication, and he does wish to remain on the Nexium for now.  We will see him back in the office in 6 months for further evaluation.    4/17/2025  Mr. Nathan returns to clinic for follow-up.  Today he has concerns regarding a new episode of abdominal pain.  He is having midline lower abdominal/pelvis pain which began earlier this week.  He endorses the area is tender and he has urinary frequency. He denies chills, fever, diaphoresis.  He does state he has had malaise and mild  lethargy.  He considered proceeding to the ER. He feels indigestion when his food is not processing well but denies bloating.  He has occasional stabbing lower abdominal pains which feel sharp.  He states he has had no decent stooling since this weekend.  He is having fecal leakage and, gas bubbles and a "gas bomb".  Small amounts of fecal material may come out with these episodes.  He is tracking his stool.  He continues Florastor, Metamucil.  He does not tolerate dicyclomine and had a very significant negative reaction in the past.  He has multiple antibiotic allergies.  He did use a rectal suppository last night. He is having no blood in his stool dysphagia nausea vomiting. He does have a history of diverticulosis and discussed with him the prefer he rule out diverticulitis with CT scan we will check labs as well.  He will advance to the emergency department if his condition worsens. SP

## 2025-04-18 ENCOUNTER — TELEPHONE ENCOUNTER (OUTPATIENT)
Dept: URBAN - NONMETROPOLITAN AREA CLINIC 2 | Facility: CLINIC | Age: 80
End: 2025-04-18

## 2025-04-22 ENCOUNTER — LAB OUTSIDE AN ENCOUNTER (OUTPATIENT)
Dept: URBAN - NONMETROPOLITAN AREA CLINIC 2 | Facility: CLINIC | Age: 80
End: 2025-04-22

## 2025-04-22 ENCOUNTER — OFFICE VISIT (OUTPATIENT)
Dept: URBAN - NONMETROPOLITAN AREA CLINIC 2 | Facility: CLINIC | Age: 80
End: 2025-04-22
Payer: MEDICARE

## 2025-04-22 ENCOUNTER — ERX REFILL RESPONSE (OUTPATIENT)
Dept: URBAN - NONMETROPOLITAN AREA CLINIC 2 | Facility: CLINIC | Age: 80
End: 2025-04-22

## 2025-04-22 DIAGNOSIS — R15.1 FECAL SMEARING: ICD-10-CM

## 2025-04-22 DIAGNOSIS — K57.90 DIVERTICULOSIS: ICD-10-CM

## 2025-04-22 DIAGNOSIS — K58.9 IRRITABLE BOWEL SYNDROME, UNSPECIFIED TYPE: ICD-10-CM

## 2025-04-22 DIAGNOSIS — R13.19 ESOPHAGEAL DYSPHAGIA: ICD-10-CM

## 2025-04-22 DIAGNOSIS — K64.9 HEMORRHOIDS, UNSPECIFIED HEMORRHOID TYPE: ICD-10-CM

## 2025-04-22 DIAGNOSIS — R10.30 ABDOMINAL PAIN, LOWER: ICD-10-CM

## 2025-04-22 DIAGNOSIS — Z12.11 COLON CANCER SCREENING: ICD-10-CM

## 2025-04-22 DIAGNOSIS — K57.92 ACUTE DIVERTICULITIS: ICD-10-CM

## 2025-04-22 DIAGNOSIS — K21.9 GASTROESOPHAGEAL REFLUX DISEASE, UNSPECIFIED WHETHER ESOPHAGITIS PRESENT: ICD-10-CM

## 2025-04-22 PROBLEM — 735593008: Status: ACTIVE | Noted: 2025-04-22

## 2025-04-22 PROBLEM — 54586004: Status: ACTIVE | Noted: 2025-04-17

## 2025-04-22 PROCEDURE — 99215 OFFICE O/P EST HI 40 MIN: CPT | Performed by: INTERNAL MEDICINE

## 2025-04-22 RX ORDER — ONDANSETRON 4 MG/1
1 TABLET ON THE TONGUE AND ALLOW TO DISSOLVE TABLET, ORALLY DISINTEGRATING ORAL TWICE A DAY
Qty: 14 TABLET | Refills: 0 | Status: ACTIVE | COMMUNITY
Start: 2025-04-17

## 2025-04-22 RX ORDER — COLESTIPOL HYDROCHLORIDE 1 G/1
1 TABLET TABLET, FILM COATED ORAL ONCE A DAY
Qty: 90 TABLET | Refills: 3 | Status: ACTIVE | COMMUNITY
Start: 2024-08-29

## 2025-04-22 RX ORDER — OMEPRAZOLE 40 MG/1
1 CAPSULE 30 MINUTES BEFORE MORNING MEAL CAPSULE, DELAYED RELEASE ORAL ONCE A DAY
Qty: 90 | Refills: 3 | OUTPATIENT
Start: 2025-04-22 | End: 2025-04-22

## 2025-04-22 RX ORDER — AMOXICILLIN 250 MG/1
2 CAPSULES CAPSULE ORAL THREE TIMES A DAY
Status: ACTIVE | COMMUNITY

## 2025-04-22 RX ORDER — ASPIRIN 81 MG/1
1 TABLET TABLET, CHEWABLE ORAL ONCE A DAY
Status: ACTIVE | COMMUNITY

## 2025-04-22 RX ORDER — OMEPRAZOLE 40 MG/1
1 CAPSULE 30 MINUTES BEFORE MORNING MEAL CAPSULE, DELAYED RELEASE ORAL ONCE A DAY
Qty: 90 | Refills: 3 | OUTPATIENT
Start: 2025-04-22

## 2025-04-22 RX ORDER — ESOMEPRAZOLE MAGNESIUM 40 MG/1
1 CAPSULE CAPSULE, DELAYED RELEASE PELLETS ORAL ONCE A DAY
Qty: 90 CAPSULE | Refills: 3 | OUTPATIENT
Start: 2025-04-22

## 2025-04-22 RX ORDER — NAPROXEN 250 MG/1
1 TABLET WITH FOOD OR MILK AS NEEDED TABLET ORAL
Status: ACTIVE | COMMUNITY

## 2025-04-22 NOTE — HPI-TODAY'S VISIT:
3/1/2023 Mr. Nathan presents for evaluation of GERD. He was referred by Dr. Lindsey. Last fall he developed a cough. He started omeprazole 20mg daily with no change. He increased his symptoms to omeprazole 40mg daily. He had an esophagram with reflux but no stricture. He did have occsasional orophayrgeal dysphagia. Howeever this improved. His cough seemed to improve. He did have some loose stools and he was placed on Luis Carlos and weaned his omeprazole to 20mg daily. He is now off PPI therapy. He has no symptoms of cough now. He denies any significant further GI complaints.   In August during a trip he developed increased gas with mild smearing with passing gas. He had a colonoscopy in 2021 with pan diverticular diseae. This has improved today. MB  This telehealth visit was provided at the patients home. 5/10/2023 Mr. Nathan presents for follow-up of reflux, dysphagia, and fecal smearing.  He is doing great on psyllium 2 at night.  He had no further fecal smearing.  His bloating has improved, he has been taking IBgard twice daily as needed.  He weaned off of the PPI then off of the Pepcid.  He has had an increase recurrence of reflux after lunch, we have discussed that the IBgard may be related.  He has not had an upper endoscopy in over the past 10 years.  Today we will pursue EGD to rule out Krishna's esophagus, I want him to restart Pepcid just in the morning.  Consider PPI therapy pending his EGD results.  Today he is doing well otherwise.  MB  8/23/2023 Mr. Nathan presents for follow-up of dysphagia.  His EGD does reveal mild esophagitis.  He is on Pepcid 20 mg at night.  He has dyspepsia with belching and some reflux after breakfast and before lunch at times.  He would like to try Pepcid twice daily, he would like to avoid PPI use as he has a strong family history of dementia.  We have discussed again that low-dose PPI is not associated with vascular dementia.  He also continues to struggle with tenesmus and fecal smearing.  He is on 2 capsules of Metamucil twice daily.  His last colonoscopy was in 2021 by Dr. Corey with internal hemorrhoids.  We have discussed this is likely the culprit.  He agrees to steroid suppositories twice daily x14 days.  Consider flexible sigmoidoscopy if no relief.  MB 11/30/2023 Mr. Nathan presents for follow-up of reflux and fecal smearing.  He is doing great on Pepcid twice daily, Florastor in the morning, and 2 capsules of Metamucil twice daily.  He feels excellent on this regimen.  He does feel like treating the hemorrhoids resolved smearing.  Today he denies any new GI complaints.  He does report some straining at times, we have discussed he can cut back on the Florastor, for now he wants to continue his current regimen.  MB 2/6/2024 Mr. Nathan presents for follow-up.  Since his last visit he is contracted COVID.  He developed bowel irregularity with worsening flare of his hemorrhoids including tenesmus and incomplete evacuation.  He is on 2 capsules of fiber twice daily and 2 capsules of Florastor twice daily.  I am concerned that the dosing of Florastor may be contributing to his complete evacuation.  He will complete his he agrees to decrease this to 1 twice daily and then 1 once daily.  Second course of 14-day steroid suppositories in the last 2 months.  If he continues to flare with anal rectal pressure with tenesmus, consider referral to Dr. Jose Hope for hemorrhoid banding.  He does describe abdominal discomfort, borborygmi, and bowel irregularity after his COVID infection last month.  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  MB 3/26/2024 The patient presents today for follow-up of his reflux, diverticulosis, dysphagia, irritable bowel syndrome with gas, and occasional fecal leakage.  The patient has been doing quite well since her last visit.  He continues to complain of occasional urgency.  He was doing much better after his hemorrhoid banding by Dr. Ambriz but he is now having leakage again.  Today, we have had a long discussion about his diet.  He does eat a large amount of fiber.  I do want him to continue his 2 Metamucil capsules twice daily, but I do want him to try to substitute some protein for some of the fiber in his diet.  I am also going to add in dicyclomine for urgency.  His hemorrhoids are much improved.  His reflux has been well-controlled with Nexium 20 mg daily.  He has not had any further dysphagia.  We will see him back in the office in 6 months for further evaluation. 5/29/2024 Mr. Nathan presents for follow-up.  His reflux symptoms improved significantly on Nexium, he is back down to famotidine twice daily with some breakthrough.  He continues to have intermittent fecal smearing.  He is taking the psyllium twice daily, and has added in the dicyclomine twice daily.  This does help with his anorectal fracture and urgency.  Given his episodes of smearing, I am concerned he may have recurrence of his hemorrhoids.  He denies any straining.  He is having a soft bowel movement most days of the week but at times will skip.  Overall today he is doing fairly well.  Will discontinue the famotidine and restart the Nexium.  He is worried about muscle mass loss, will refer him to a nutritionist.  In regard to his fecal smearing, we will try hydrocortisone suppository twice daily for 7 days, he may have a recurrent internal hemorrhoid.  If no relief he does plan to follow-up with Dr. Hope so that she can examine him to make sure that he does not have recurrence.  Would continue psyllium twice daily, Florastor daily, and dicyclomine twice daily to 3 times daily as needed.MB 8/29/2024 Mr. Nathan presents for follow-up.  Dicyclomine caused significant dizziness and vertigo.  After discontinuing this he is continued Florastor daily 2 capsules of psyllium twice daily and 2 IBgard 3 times daily AC.  He had another episode of fecal incontinence today with passing gas.  He denies any rectal bleeding.  Today he agrees to discontinue the IBgard regularly and use as needed for bloating.  He will try colestipol 1 g daily for his incontinence.  He will continue low-dose psyllium at night and Florastor daily.  His reflux is controlled on as omeprazole 20 mg daily.  MB 12/12/2024 Deon presents for follow-up.  He had a flare of incontinence with likely hemorrhoids in November we started steroid suppositories with resolution.  He is somewhat constipated on fiber colestipol and Florastor, we will him to discontinue the Florastor.  If he stops the colestipol he is concerned he will develop diarrhea again.  His main complaint is always incontinence and gas.  This has improved after the course of the steroid suppositories so I do suspect that an internal hemorrhoid plays a role in his incontinence, in regard to his gas he does eat a high-fiber diet he wants to hold off on Xifaxan for now.  MB 2/18/2025 The patient presents today for follow-up of his reflux and hemorrhoids.  Since her last visit, he was becoming more constipated on Florastor and colestipol with fiber.  He has stopped his Florastor, and his symptoms have now improved.  He is usually having 1, formed bowel movement daily.  We have discussed that if he does begin to have leakage again, I would add back the Florastor.  In the meantime, I would continue his to fiber capsules as at night and his colestipol in the morning.  His reflux has been well-controlled on Nexium 20 mg daily.  We have discussed the risks and benefits of staying on this medication, and he does wish to remain on the Nexium for now.  We will see him back in the office in 6 months for further evaluation.    4/17/2025  Mr. Nathan returns to clinic for follow-up.  Today he has concerns regarding a new episode of abdominal pain.  He is having midline lower abdominal/pelvis pain which began earlier this week.  He endorses the area is tender and he has urinary frequency. He denies chills, fever, diaphoresis.  He does state he has had malaise and mild  lethargy.  He considered proceeding to the ER. He feels indigestion when his food is not processing well but denies bloating.  He has occasional stabbing lower abdominal pains which feel sharp.  He states he has had no decent stooling since this weekend.  He is having fecal leakage and, gas bubbles and a "gas bomb".  Small amounts of fecal material may come out with these episodes.  He is tracking his stool.  He continues Florastor, Metamucil.  He does not tolerate dicyclomine and had a very significant negative reaction in the past.  He has multiple antibiotic allergies.  He did use a rectal suppository last night. He is having no blood in his stool dysphagia nausea vomiting. He does have a history of diverticulosis and discussed with him the prefer he rule out diverticulitis with CT scan we will check labs as well.  He will advance to the emergency department if his condition worsens. SP 4/22/2025 Deon presents today for follow-up for acute diverticulitis.  He was seen in clinic 4/17 for acute lower abdominal pain, constipation, fecal leakage. He was scheduled for a CT and lab work for the following day but states that he went to the ER later that evening due to worsening pain.  He had a CT scan in the ER that revealed constipation and acute diverticulitis versus colitis.  He reports starting Augmentin 875mg 3 times daily which has significantly improved his pain. He reports that his abd pain is now a 0/10. He reports that his BMs have normalized- daily without blood but still admits to taking a while to pass stool. He is taking MEtamucil 2 capsules nightly. He admits that prior to his ER visit, he had not had a BM in 4 days. He admits that he will frequently go a few days without a BM. He states he takes Colestipol daily for his fecal leakage. He does not assocaite fecal leakage with infrequent BMs. He also reports continued esophagial dysphagia. He takes OTC nexium daily for his symptoms. Last EGD was 2023 that revealead esophagitis and gastritis. WIill increase Nexium to 40mg daily and further evaluate dysphagia with EGD. Discussed likely overflow diarrhea secondary to constipation. Start on Miralax 1 cap daily and continue Metamucil at night. Will schedule colonoscopy in 6-8wks to evaluate diverticula. Follow up [id number]

## 2025-04-30 ENCOUNTER — TELEPHONE ENCOUNTER (OUTPATIENT)
Dept: URBAN - NONMETROPOLITAN AREA CLINIC 2 | Facility: CLINIC | Age: 80
End: 2025-04-30

## 2025-06-17 ENCOUNTER — CLAIMS CREATED FROM THE CLAIM WINDOW (OUTPATIENT)
Dept: URBAN - METROPOLITAN AREA CLINIC 4 | Facility: CLINIC | Age: 80
End: 2025-06-17
Payer: MEDICARE

## 2025-06-17 ENCOUNTER — CLAIMS CREATED FROM THE CLAIM WINDOW (OUTPATIENT)
Dept: URBAN - NONMETROPOLITAN AREA SURGERY CENTER 1 | Facility: SURGERY CENTER | Age: 80
End: 2025-06-17
Payer: MEDICARE

## 2025-06-17 DIAGNOSIS — K21.9 GASTRO-ESOPHAGEAL REFLUX DISEASE WITHOUT ESOPHAGITIS: ICD-10-CM

## 2025-06-17 DIAGNOSIS — K63.89 MELANOSIS COLI: ICD-10-CM

## 2025-06-17 DIAGNOSIS — K63.89 OTHER SPECIFIED DISEASES OF INTESTINE: ICD-10-CM

## 2025-06-17 DIAGNOSIS — K29.70 CHRONIC GASTRITIS: ICD-10-CM

## 2025-06-17 DIAGNOSIS — K29.60 OTHER GASTRITIS WITHOUT BLEEDING: ICD-10-CM

## 2025-06-17 DIAGNOSIS — K29.70 GASTRITIS, UNSPECIFIED, WITHOUT BLEEDING: ICD-10-CM

## 2025-06-17 DIAGNOSIS — R19.4 ALTERATION IN BOWEL ELIMINATION: ICD-10-CM

## 2025-06-17 DIAGNOSIS — K21.9 ACID REFLUX: ICD-10-CM

## 2025-06-17 DIAGNOSIS — K31.89 OTHER DISEASES OF STOMACH AND DUODENUM: ICD-10-CM

## 2025-06-17 PROCEDURE — 88305 TISSUE EXAM BY PATHOLOGIST: CPT | Performed by: PATHOLOGY

## 2025-06-17 PROCEDURE — 43239 EGD BIOPSY SINGLE/MULTIPLE: CPT | Performed by: INTERNAL MEDICINE

## 2025-06-17 PROCEDURE — 43239 EGD BIOPSY SINGLE/MULTIPLE: CPT | Performed by: CLINIC/CENTER

## 2025-06-17 PROCEDURE — 88312 SPECIAL STAINS GROUP 1: CPT | Performed by: PATHOLOGY

## 2025-06-17 PROCEDURE — 00813 ANES UPR LWR GI NDSC PX: CPT | Performed by: NURSE ANESTHETIST, CERTIFIED REGISTERED

## 2025-06-17 PROCEDURE — 88342 IMHCHEM/IMCYTCHM 1ST ANTB: CPT | Performed by: PATHOLOGY

## 2025-06-17 PROCEDURE — 45380 COLONOSCOPY AND BIOPSY: CPT | Performed by: INTERNAL MEDICINE

## 2025-06-17 PROCEDURE — 45380 COLONOSCOPY AND BIOPSY: CPT | Performed by: CLINIC/CENTER

## 2025-06-17 RX ORDER — NAPROXEN 250 MG/1
1 TABLET WITH FOOD OR MILK AS NEEDED TABLET ORAL
Status: ACTIVE | COMMUNITY

## 2025-06-17 RX ORDER — COLESTIPOL HYDROCHLORIDE 1 G/1
1 TABLET TABLET, FILM COATED ORAL ONCE A DAY
Qty: 90 TABLET | Refills: 3 | Status: ACTIVE | COMMUNITY
Start: 2024-08-29

## 2025-06-17 RX ORDER — AMOXICILLIN 250 MG/1
2 CAPSULES CAPSULE ORAL THREE TIMES A DAY
Status: ACTIVE | COMMUNITY

## 2025-06-17 RX ORDER — ONDANSETRON 4 MG/1
1 TABLET ON THE TONGUE AND ALLOW TO DISSOLVE TABLET, ORALLY DISINTEGRATING ORAL TWICE A DAY
Qty: 14 TABLET | Refills: 0 | Status: ACTIVE | COMMUNITY
Start: 2025-04-17

## 2025-06-17 RX ORDER — ESOMEPRAZOLE MAGNESIUM 40 MG/1
1 CAPSULE CAPSULE, DELAYED RELEASE PELLETS ORAL ONCE A DAY
Qty: 90 CAPSULE | Refills: 3 | Status: ACTIVE | COMMUNITY
Start: 2025-04-22

## 2025-06-17 RX ORDER — ASPIRIN 81 MG/1
1 TABLET TABLET, CHEWABLE ORAL ONCE A DAY
Status: ACTIVE | COMMUNITY

## 2025-08-19 ENCOUNTER — OFFICE VISIT (OUTPATIENT)
Dept: URBAN - NONMETROPOLITAN AREA CLINIC 2 | Facility: CLINIC | Age: 80
End: 2025-08-19
Payer: MEDICARE

## 2025-08-19 DIAGNOSIS — K58.2 IRRITABLE BOWEL SYNDROME WITH BOTH CONSTIPATION AND DIARRHEA: ICD-10-CM

## 2025-08-19 DIAGNOSIS — R13.19 ESOPHAGEAL DYSPHAGIA: ICD-10-CM

## 2025-08-19 DIAGNOSIS — K64.9 HEMORRHOIDS, UNSPECIFIED HEMORRHOID TYPE: ICD-10-CM

## 2025-08-19 DIAGNOSIS — R15.1 FECAL SMEARING: ICD-10-CM

## 2025-08-19 DIAGNOSIS — Z12.11 COLON CANCER SCREENING: ICD-10-CM

## 2025-08-19 DIAGNOSIS — K57.92 ACUTE DIVERTICULITIS: ICD-10-CM

## 2025-08-19 DIAGNOSIS — K57.90 DIVERTICULOSIS: ICD-10-CM

## 2025-08-19 PROBLEM — 10743008: Status: ACTIVE | Noted: 2025-08-19

## 2025-08-19 PROCEDURE — 99214 OFFICE O/P EST MOD 30 MIN: CPT | Performed by: NURSE PRACTITIONER

## 2025-08-19 RX ORDER — AMOXICILLIN 250 MG/1
2 CAPSULES CAPSULE ORAL THREE TIMES A DAY
Status: ACTIVE | COMMUNITY

## 2025-08-19 RX ORDER — ONDANSETRON 4 MG/1
1 TABLET ON THE TONGUE AND ALLOW TO DISSOLVE TABLET, ORALLY DISINTEGRATING ORAL TWICE A DAY
Qty: 14 TABLET | Refills: 0 | Status: ACTIVE | COMMUNITY
Start: 2025-04-17

## 2025-08-19 RX ORDER — COLESTIPOL HYDROCHLORIDE 1 G/1
1 TABLET TABLET, FILM COATED ORAL ONCE A DAY
Qty: 90 TABLET | Refills: 3 | Status: ACTIVE | COMMUNITY
Start: 2024-08-29

## 2025-08-19 RX ORDER — NAPROXEN 250 MG/1
1 TABLET WITH FOOD OR MILK AS NEEDED TABLET ORAL
Status: ACTIVE | COMMUNITY

## 2025-08-19 RX ORDER — ASPIRIN 81 MG/1
1 TABLET TABLET, CHEWABLE ORAL ONCE A DAY
Status: ACTIVE | COMMUNITY

## 2025-08-19 RX ORDER — ESOMEPRAZOLE MAGNESIUM 40 MG/1
1 CAPSULE CAPSULE, DELAYED RELEASE PELLETS ORAL ONCE A DAY
Qty: 90 CAPSULE | Refills: 3 | Status: ACTIVE | COMMUNITY
Start: 2025-04-22